# Patient Record
Sex: FEMALE | Race: WHITE | NOT HISPANIC OR LATINO | Employment: UNEMPLOYED | URBAN - METROPOLITAN AREA
[De-identification: names, ages, dates, MRNs, and addresses within clinical notes are randomized per-mention and may not be internally consistent; named-entity substitution may affect disease eponyms.]

---

## 2017-01-01 ENCOUNTER — MEDICAL CORRESPONDENCE (OUTPATIENT)
Dept: HEALTH INFORMATION MANAGEMENT | Facility: CLINIC | Age: 27
End: 2017-01-01

## 2017-01-06 ENCOUNTER — VIRTUAL VISIT (OUTPATIENT)
Dept: FAMILY MEDICINE | Facility: CLINIC | Age: 27
End: 2017-01-06
Payer: COMMERCIAL

## 2017-01-06 DIAGNOSIS — Z02.9 ADMINISTRATIVE ENCOUNTER: Primary | ICD-10-CM

## 2017-01-06 DIAGNOSIS — F39 EPISODIC MOOD DISORDER (H): ICD-10-CM

## 2017-01-06 DIAGNOSIS — R62.50 DEVELOPMENTAL DELAY: ICD-10-CM

## 2017-01-06 DIAGNOSIS — Q03.9 CONGENITAL HYDROCEPHALUS (H): ICD-10-CM

## 2017-01-06 PROCEDURE — 98966 PH1 ASSMT&MGMT NQHP 5-10: CPT | Performed by: PHYSICIAN ASSISTANT

## 2017-01-06 NOTE — Clinical Note
January 6, 2017        Perlita Molina  [address] [place] [address]          To whom it may concern:    RE: Perlita Bhat is under my profession medical care at the [facility].  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  She has a Developmental Delay that makes her incapable of self support or handling her own finances.  She has seen many specialists in the past for this, including a neurologist, a neurosurgeon, a psychologist and a psychiatrist.  Her condition is stable at this time and I do not expect any drastic changes in the future, therefore she no longer managed by any of the above listed specialists.  Her last exam was on 11/29/16 and her physical exam findings were stable.      Please contact me for questions or concerns.      Sincerely,        Lorna James PA-C

## 2017-01-06 NOTE — PROGRESS NOTES
"HPI      ROS      Physical Exam      SUBJECTIVE:                                                      Perlita Molina is a 26 year old female who is being evaluated via a telephone visit.      The patient has been notified of following:     \"This telephone visit will be conducted via a call between you and your physician/provider. We have found that certain health care needs can be provided without the need for a physical exam.  This service lets us provide the care you need with a short phone conversation.  If a prescription is necessary we can send it directly to your pharmacy.  If lab work is needed we can place an order for that and you can then stop by our lab to have the test done at a later time.    We will bill your insurance company for this service.  Please check with your medical insurance if this type of visit is covered. You may be responsible for the cost of this type of visit if insurance coverage is denied.  The typical cost is $30 (10min), $59 (11-20min) and $85 (21-30min).  Most often these visits are shorter than 10 minutes.    If during the course of the call the physician/provider feels a telephone visit is not appropriate, you will not be charged for this service.\"       Consent has been obtained for this service by 1 care team members: yes. See the scanned image in the medical record.    Perlita Molina complains of  Forms      I have reviewed and updated the patient's Past Medical History, Social History, Family History and Medication List.    ALLERGIES  Nkda    Nikia,CMA (MA signature)    Additional provider notes:   Start: 9:24 am  End: 9:30 am    We started this phone visit to discuss paperwork needed by the government that confirms her developmental disability.  See letter from today for complete information discussed on phone today.   Psychiatrist: was seeing a psychiatrist, however symptoms stable therefore I am managing her prescriptions.   Condition Stable.    Restrictions:  Mental " restrictions.  Difficulty with running, will sometimes shake.    She goes to a day program and does activities in the community.  She does work in the production room (assembling things).   Incapable of self support or handling her own finances.    Assessment/Plan:  (Z02.9) Administrative encounter  (primary encounter diagnosis)  Comment: Forms completed, letter written on letterhead.   Plan:     (Q03.9) Congenital hydrocephalus (H)  Comment: stable.   Plan:     (R62.50) Developmental delay  Comment: stable.   Plan:     (F39) Episodic mood disorder (H)  Comment: stable.   Plan:       I have reviewed the note as documented above.  This accurately captures the substance of my conversation with the patient,  Lorna James PA-C     Total time of call between patient and provider was 6 minutes     Lorna James PA-C

## 2017-01-15 DIAGNOSIS — K59.01 SLOW TRANSIT CONSTIPATION: Primary | ICD-10-CM

## 2017-01-15 NOTE — TELEPHONE ENCOUNTER
polyethylene glycol (MIRALAX/GLYCOLAX) powder      Last Written Prescription Date: 12/28/15  Last Fill Quantity: 527g,  # refills: 3   Last Office Visit with [facility], [facility] or [facility] prescribing provider: 11/29/16

## 2017-01-17 RX ORDER — POLYETHYLENE GLYCOL 3350 17 G/17G
POWDER, FOR SOLUTION ORAL
Qty: 527 G | Refills: 2 | Status: SHIPPED | OUTPATIENT
Start: 2017-01-17 | End: 2017-08-21

## 2017-01-17 NOTE — TELEPHONE ENCOUNTER
Prescription approved per [facility] Refill Protocol.  Dionna Christensen RN

## 2017-02-11 DIAGNOSIS — F45.8 ANXIETY HYPERVENTILATION: Primary | ICD-10-CM

## 2017-02-11 DIAGNOSIS — F41.9 ANXIETY HYPERVENTILATION: Primary | ICD-10-CM

## 2017-02-12 NOTE — TELEPHONE ENCOUNTER
FLUoxetine (PROZAC) 20 MG capsule       Last Written Prescription Date: 12/28/16  Last Fill Quantity: 90; # refills: 3  Last Office Visit with [facility], [facility] or [facility] prescribing provider:  11/29/16        Last PHQ-9 score on record=   PHQ-9 SCORE 11/24/2010   Total Score 4       No results found for: AST  No results found for: ALT

## 2017-02-14 NOTE — TELEPHONE ENCOUNTER
Prescription approved per [facility] Refill Protocol.  Kavita Patel RN

## 2017-02-25 DIAGNOSIS — N92.0 EXCESSIVE OR FREQUENT MENSTRUATION: ICD-10-CM

## 2017-02-25 NOTE — TELEPHONE ENCOUNTER
levonorgest-eth estrad 91-Day (AMETHIA) 0.15-0.03 &0.01 MG per tablet      Last Written Prescription Date: 11/29/16  Last Fill Quantity: 91, # refills: 0  Last Office Visit with [facility], P or [facility] prescribing provider: 12/30/16       BP Readings from Last 3 Encounters:   11/29/16 130/74   12/28/15 122/84   09/02/15 135/84     Date of last Breast Exam: unknown

## 2017-02-28 RX ORDER — ETHINYL ESTRADIOL AND LEVONORGESTREL 150-30(84)
KIT ORAL
Qty: 91 TABLET | Refills: 1 | Status: SHIPPED | OUTPATIENT
Start: 2017-02-28 | End: 2017-08-21

## 2017-02-28 NOTE — TELEPHONE ENCOUNTER
Routing refill request to provider for review/approval because:  Drug not on the [facility] refill protocol with associated diagnosis  Dionna Christensen RN

## 2017-05-03 ENCOUNTER — DOCUMENTATION ONLY (OUTPATIENT)
Dept: OTHER | Facility: CLINIC | Age: 27
End: 2017-05-03

## 2017-05-03 DIAGNOSIS — Z71.89 ACP (ADVANCE CARE PLANNING): Chronic | ICD-10-CM

## 2017-05-13 DIAGNOSIS — F41.9 ANXIETY HYPERVENTILATION: ICD-10-CM

## 2017-05-13 DIAGNOSIS — F45.8 ANXIETY HYPERVENTILATION: ICD-10-CM

## 2017-05-13 NOTE — TELEPHONE ENCOUNTER
FLUoxetine (PROZAC) 20 MG capsule     Last Written Prescription Date: 2/14/17  Last Fill Quantity: 90, # refills: 0  Last Office Visit with G primary care provider:  11/29/16        Last PHQ-9 score on record=   PHQ-9 SCORE 11/24/2010   Total Score 4               Regan Faarax  [facility]

## 2017-05-17 NOTE — TELEPHONE ENCOUNTER
Prescription approved per [facility] Refill Protocol.  Dionna Christensen RN

## 2017-08-12 DIAGNOSIS — F45.8 ANXIETY HYPERVENTILATION: ICD-10-CM

## 2017-08-12 DIAGNOSIS — F41.9 ANXIETY HYPERVENTILATION: ICD-10-CM

## 2017-08-12 NOTE — TELEPHONE ENCOUNTER
FLUoxetine (PROZAC) 20 MG capsule     Last Written Prescription Date: 5/17/17  Last Fill Quantity: 90, # refills: 0  Last Office Visit with [facility] primary care provider:  11/29/16   Next 5 appointments (look out 90 days)     Aug 21, 2017  2:10 PM CDT   SHORT with Kristen M Kehr, PA-C   [facility]    [address] [facility] [address] [phone]                   Last PHQ-9 score on record=   PHQ-9 SCORE 11/24/2010   Total Score 4             Nereida Rose  [facility]

## 2017-08-21 ENCOUNTER — OFFICE VISIT (OUTPATIENT)
Dept: FAMILY MEDICINE | Facility: CLINIC | Age: 27
End: 2017-08-21
Payer: COMMERCIAL

## 2017-08-21 VITALS
SYSTOLIC BLOOD PRESSURE: 134 MMHG | TEMPERATURE: 97.2 F | HEART RATE: 99 BPM | OXYGEN SATURATION: 97 % | BODY MASS INDEX: 35.52 KG/M2 | DIASTOLIC BLOOD PRESSURE: 86 MMHG | WEIGHT: 197 LBS

## 2017-08-21 DIAGNOSIS — N92.0 EXCESSIVE OR FREQUENT MENSTRUATION: Primary | ICD-10-CM

## 2017-08-21 DIAGNOSIS — K59.01 SLOW TRANSIT CONSTIPATION: ICD-10-CM

## 2017-08-21 DIAGNOSIS — E28.2 PCOS (POLYCYSTIC OVARIAN SYNDROME): ICD-10-CM

## 2017-08-21 DIAGNOSIS — F39 EPISODIC MOOD DISORDER (H): ICD-10-CM

## 2017-08-21 DIAGNOSIS — Q03.9 CONGENITAL HYDROCEPHALUS (H): ICD-10-CM

## 2017-08-21 PROCEDURE — 99214 OFFICE O/P EST MOD 30 MIN: CPT | Performed by: PHYSICIAN ASSISTANT

## 2017-08-21 RX ORDER — LEVONORGESTREL / ETHINYL ESTRADIOL AND ETHINYL ESTRADIOL 150-30(84)
1 KIT ORAL DAILY
Qty: 91 TABLET | Refills: 3 | Status: SHIPPED | OUTPATIENT
Start: 2017-08-21 | End: 2018-09-08

## 2017-08-21 RX ORDER — POLYETHYLENE GLYCOL 3350 17 G/17G
POWDER, FOR SOLUTION ORAL
Qty: 527 G | Refills: 11 | Status: SHIPPED | OUTPATIENT
Start: 2017-08-21 | End: 2018-11-10

## 2017-08-21 NOTE — MR AVS SNAPSHOT
After Visit Summary   8/21/2017    Perlita Molina    MRN: [id number]           Patient Information     Date Of Birth          1990        Visit Information        Provider Department      8/21/2017 2:10 PM Kehr, Kristen M, PA-C [facility]        Today's Diagnoses     Excessive or frequent menstruation        Slow transit constipation           Follow-ups after your visit        Who to contact     If you have questions or need follow up information about today's clinic visit or your schedule please contact [facility] directly at [phone].  Normal or non-critical lab and imaging results will be communicated to you by Cura TVhart, letter or phone within 4 business days after the clinic has received the results. If you do not hear from us within 7 days, please contact the clinic through UpTot or phone. If you have a critical or abnormal lab result, we will notify you by phone as soon as possible.  Submit refill requests through [employer] or call your pharmacy and they will forward the refill request to us. Please allow 3 business days for your refill to be completed.          Additional Information About Your Visit        MyChart Information     [employer] gives you secure access to your electronic health record. If you see a primary care provider, you can also send messages to your care team and make appointments. If you have questions, please call your primary care clinic.  If you do not have a primary care provider, please call [phone] and they will assist you.        Care EveryWhere ID     This is your Care EveryWhere ID. This could be used by other organizations to access your [place] medical records  [id number]        Your Vitals Were     Pulse Temperature Pulse Oximetry BMI (Body Mass Index)          99 97.2  F (36.2  C) (Oral) 97% 35.52 kg/m2         Blood Pressure from Last 3 Encounters:   08/21/17 134/86   11/29/16 130/74   12/28/15 122/84    Weight from Last 3  Encounters:   08/21/17 197 lb (89.4 kg)   11/29/16 198 lb 3.2 oz (89.9 kg)   12/28/15 186 lb (84.4 kg)              Today, you had the following     No orders found for display         Today's Medication Changes          These changes are accurate as of: 8/21/17  2:44 PM.  If you have any questions, ask your nurse or doctor.               These medicines have changed or have updated prescriptions.        Dose/Directions    levonorgest-eth estrad 91-Day 0.15-0.03 &0.01 MG per tablet   Commonly known as:  AMETHIA   This may have changed:  See the new instructions.   Used for:  Excessive or frequent menstruation   Changed by:  Kehr, Kristen M, PA-C        Dose:  1 tablet   Take 1 tablet by mouth daily   Quantity:  91 tablet   Refills:  3       polyethylene glycol powder   Commonly known as:  MIRALAX/GLYCOLAX   This may have changed:  See the new instructions.   Used for:  Slow transit constipation   Changed by:  Kehr, Kristen M, PA-C        TAKE A HALF TO ONE CAPFUL IN 8OZ OF BEVERAGE AND DRINK ONCE DAILY   Quantity:  527 g   Refills:  11            Where to get your medicines      These medications were sent to [facility] [address] [place] [address] AT [address] [address]     Phone:  [phone]     levonorgest-eth estrad 91-Day 0.15-0.03 &0.01 MG per tablet    polyethylene glycol powder                Primary Care Provider Office Phone # Fax #    Lorna Leighann James PA-C [phone]       [address]  [facility] [address]        Equal Access to Services     [facility]: Hadii marky larkin Soomaali, waaxda luqadaha, qaybta kaalmada adeegyada, sarath cottrell. So [facility] [phone].    ATENCIÓN: Si habla español, tiene a arrieta disposición servicios gratuitos de asistencia lingüística. Llame al [phone].    We comply with applicable federal civil rights laws and [place] laws. We do  not discriminate on the basis of race, color, national origin, age, disability sex, sexual orientation or gender identity.            Thank you!     Thank you for choosing [facility]  [facility]. Our goal is always to provide you with excellent care. Hearing back from our patients is one way we can continue to improve our services. Please take a few minutes to complete the written survey that you may receive in the mail after your visit with us. Thank you!             Your Updated Medication List - Protect others around you: Learn how to safely use, store and throw away your medicines at www.disposemymeds.org.          This list is accurate as of: 8/21/17  2:45 PM.  Always use your most recent med list.                   Brand Name Dispense Instructions for use Diagnosis    DAILY MULTIVITAMIN PO      Take  by mouth.        FLUoxetine 20 MG capsule    PROzac    90 capsule    TAKE ONE CAPSULE BY MOUTH EVERY DAY    Anxiety hyperventilation       levonorgest-eth estrad 91-Day 0.15-0.03 &0.01 MG per tablet    AMETHIA    91 tablet    Take 1 tablet by mouth daily    Excessive or frequent menstruation       polyethylene glycol powder    MIRALAX/GLYCOLAX    527 g    TAKE A HALF TO ONE CAPFUL IN 8OZ OF BEVERAGE AND DRINK ONCE DAILY    Slow transit constipation

## 2017-08-21 NOTE — PROGRESS NOTES
SUBJECTIVE:   Perlita Molina is a 27 year old female who presents to clinic today for the following health issues:      Establish care and discuss medications.   Perlita is here today with her mother. She has developmental delay and was established in the [place] office. Her provider is transferring to a new location. She is here to establish and get refills of her medication.     She has a history of congenital hydrocephalus and is managed by Neurosurgery at [place]. She is seen every 2 years.     She has PCOS regulated with OCP. She is due for a refill of her prescription today.     Lastly, she also has mood swings and was evaluated by Psychiatry in the past and started prozac. She has been on 20 mg for years. She was eventually transitioned to Family Practice to prescribe this medication.         Problem list and histories reviewed & adjusted, as indicated.  Additional history: as documented    Patient Active Problem List   Diagnosis     RET s/p repair x2     Congenital hydrocephalus (H)     Developmental delay     PCOS (polycystic ovarian syndrome)     Nonfunctioning ventriculoperitoneal shunt (H)     Episodic mood disorder (H)     Hearing loss     Constipation     CARDIOVASCULAR SCREENING; LDL GOAL LESS THAN 160     Excessive or frequent menstruation     Anxiety     Congenital jaw deformity     ACP (advance care planning)     Past Surgical History:   Procedure Laterality Date     BIOPSY  August 2017    Awaiting results     EYE SURGERY  age 4 or 5    strabismus      IMPLANT SHUNT VENTRICULOPERITONEAL  1 week of age    for hydrocephalus; had last revision at age 3     STRABISMUS SURGERY       SURGICAL HISTORY OF -   by age 5    strabismus surgery x2       Social History   Substance Use Topics     Smoking status: Never Smoker     Smokeless tobacco: Never Used     Alcohol use No     Family History   Problem Relation Age of Onset     CANCER Paternal Grandmother      HEART DISEASE Paternal Grandfather       DIABETES Mother      Hypertension Mother      Obesity Mother      Hypertension Father      Obesity Father          Current Outpatient Prescriptions   Medication Sig Dispense Refill     levonorgest-eth estrad 91-Day (AMETHIA) 0.15-0.03 &0.01 MG per tablet Take 1 tablet by mouth daily 91 tablet 3     polyethylene glycol (MIRALAX/GLYCOLAX) powder TAKE A HALF TO ONE CAPFUL IN 8OZ OF BEVERAGE AND DRINK ONCE DAILY 527 g 11     FLUoxetine (PROZAC) 20 MG capsule TAKE ONE CAPSULE BY MOUTH EVERY DAY 90 capsule 0     Multiple Vitamin (DAILY MULTIVITAMIN PO) Take  by mouth.       [DISCONTINUED] AMETHIA 0.15-0.03 &0.01 MG per tablet TAKE 1 TABLET BY MOUTH DAILY 91 tablet 1     Allergies   Allergen Reactions     Nkda [No Known Drug Allergies]          Reviewed and updated as needed this visit by clinical staffTobacco  Allergies  Meds  Med Hx  Surg Hx  Fam Hx  Soc Hx      Reviewed and updated as needed this visit by Provider         ROS:  Constitutional, HEENT, cardiovascular, pulmonary, gi and gu systems are negative, except as otherwise noted.      OBJECTIVE:   /86  Pulse 99  Temp 97.2  F (36.2  C) (Oral)  Wt 197 lb (89.4 kg)  SpO2 97%  BMI 35.52 kg/m2  Body mass index is 35.52 kg/(m^2).  GENERAL: healthy, alert and no distress  PSYCH: affect normal/bright    Diagnostic Test Results:  none     ASSESSMENT/PLAN:       1. Excessive or frequent menstruation   2. PCOS  Stable with the use of oral contraception. She has a menstrual cycle every 3 months.   - levonorgest-eth estrad 91-Day (AMETHIA) 0.15-0.03 &0.01 MG per tablet; Take 1 tablet by mouth daily  Dispense: 91 tablet; Refill: 3    3. Slow transit constipation  Stable with the use of  Miralax. Refills given  - polyethylene glycol (MIRALAX/GLYCOLAX) powder; TAKE A HALF TO ONE CAPFUL IN 8OZ OF BEVERAGE AND DRINK ONCE DAILY  Dispense: 527 g; Refill: 11    4. Episodic mood disorder  Stable with the use of the prozac. She does not need refills at this time.   She  is unable to complete the PHQ9 due to developmental delay.   Plan refills x 1 year when she is due. Mother will continue to monitor and notify if there are changes.     5. Congenital hydrocephalus  Managed by [facility]    Plan to see her yearly, unless concerns or changes.     Kristen M. Kehr, PA-C  [facility]

## 2017-08-21 NOTE — NURSING NOTE
"Chief Complaint   Patient presents with     Establish Care       Initial /86  Pulse 99  Temp 97.2  F (36.2  C) (Oral)  Wt 197 lb (89.4 kg)  SpO2 97%  BMI 35.52 kg/m2 Estimated body mass index is 35.52 kg/(m^2) as calculated from the following:    Height as of 11/29/16: 5' 2.44\" (1.586 m).    Weight as of this encounter: 197 lb (89.4 kg).  Medication Reconciliation: complete    ELIAS Peacock MA    "

## 2017-10-17 DIAGNOSIS — F45.8 ANXIETY HYPERVENTILATION: ICD-10-CM

## 2017-10-17 DIAGNOSIS — F41.9 ANXIETY HYPERVENTILATION: ICD-10-CM

## 2017-11-15 ENCOUNTER — TRANSFERRED RECORDS (OUTPATIENT)
Dept: HEALTH INFORMATION MANAGEMENT | Facility: CLINIC | Age: 27
End: 2017-11-15

## 2017-12-09 DIAGNOSIS — F41.9 ANXIETY HYPERVENTILATION: ICD-10-CM

## 2017-12-09 DIAGNOSIS — F45.8 ANXIETY HYPERVENTILATION: ICD-10-CM

## 2017-12-18 ENCOUNTER — TELEPHONE (OUTPATIENT)
Dept: FAMILY MEDICINE | Facility: CLINIC | Age: 27
End: 2017-12-18

## 2017-12-18 NOTE — TELEPHONE ENCOUNTER
Advise can refill prescription from 10/19/17 for 1 year.  Verbalized good understanding.   Billie Lyn RN

## 2017-12-18 NOTE — TELEPHONE ENCOUNTER
Skyla from [facility] is calling to request RX: FLUoxetine (PROZAC) 20 MG capsule. Thank  You.

## 2018-01-22 ENCOUNTER — OFFICE VISIT (OUTPATIENT)
Dept: OPHTHALMOLOGY | Facility: CLINIC | Age: 28
End: 2018-01-22
Payer: COMMERCIAL

## 2018-01-22 DIAGNOSIS — H52.202 ASTIGMATISM OF LEFT EYE, UNSPECIFIED TYPE: ICD-10-CM

## 2018-01-22 DIAGNOSIS — H50.00 ESOTROPIA: Primary | ICD-10-CM

## 2018-01-22 DIAGNOSIS — H52.13 MYOPIA OF BOTH EYES: ICD-10-CM

## 2018-01-22 DIAGNOSIS — Q03.9 CONGENITAL HYDROCEPHALUS (H): ICD-10-CM

## 2018-01-22 PROCEDURE — 92015 DETERMINE REFRACTIVE STATE: CPT | Performed by: STUDENT IN AN ORGANIZED HEALTH CARE EDUCATION/TRAINING PROGRAM

## 2018-01-22 PROCEDURE — 92014 COMPRE OPH EXAM EST PT 1/>: CPT | Performed by: STUDENT IN AN ORGANIZED HEALTH CARE EDUCATION/TRAINING PROGRAM

## 2018-01-22 ASSESSMENT — VISUAL ACUITY
OD_CC+: -1
OS_CC+: -1
METHOD: SNELLEN - LINEAR
OD_CC: 20/70
OD_PH_CC: 20/50
OS_PH_CC: 40+2
OS_CC: 3
CORRECTION_TYPE: GLASSES
OD_CC: 3
OS_CC: 20/40

## 2018-01-22 ASSESSMENT — REFRACTION_WEARINGRX
OD_SPHERE: -7.50
OS_AXIS: 112
OS_CYLINDER: +0.75
OD_CYLINDER: +0.50
OS_SPHERE: -6.75
OD_AXIS: 176
SPECS_TYPE: SVL

## 2018-01-22 ASSESSMENT — REFRACTION_MANIFEST
OS_CYLINDER: +0.50
OS_SPHERE: -7.25
OD_SPHERE: -8.25
OS_AXIS: 120
OD_CYLINDER: SPHERE

## 2018-01-22 ASSESSMENT — CONF VISUAL FIELD
OS_NORMAL: 1
OD_NORMAL: 1

## 2018-01-22 ASSESSMENT — TONOMETRY
IOP_METHOD: APPLANATION
OD_IOP_MMHG: 13
OS_IOP_MMHG: 13

## 2018-01-22 ASSESSMENT — SLIT LAMP EXAM - LIDS
COMMENTS: NORMAL
COMMENTS: NORMAL

## 2018-01-22 ASSESSMENT — EXTERNAL EXAM - LEFT EYE: OS_EXAM: NORMAL

## 2018-01-22 ASSESSMENT — CUP TO DISC RATIO
OS_RATIO: 0.6
OD_RATIO: 0.6

## 2018-01-22 ASSESSMENT — EXTERNAL EXAM - RIGHT EYE: OD_EXAM: NORMAL

## 2018-01-22 NOTE — LETTER
Perlita Molina   [id number]    January 22, 2018    Dear Colleague,    Your patient, Perlita Molina, was seen at the [facility] ophthalmology clinic on January 22, 2018. Her eye exam was stable at this time.     If you require any questions or concerns, please feel free to contact me.      Sincerely,    Litzy Lee  [facility]  [address] [place] [address] [phone]

## 2018-01-22 NOTE — MR AVS SNAPSHOT
After Visit Summary   1/22/2018    Perlita Molina    MRN: [id number]           Patient Information     Date Of Birth          1990        Visit Information        Provider Department      1/22/2018 2:00 PM Litzy Lee MD [facility]        Today's Diagnoses     RET s/p repair x2    -  1    Congenital hydrocephalus        Myopia of both eyes        Astigmatism of left eye, unspecified type          Care Instructions    Glasses prescription given - optional     Litzy Lee MD  [phone]            Follow-ups after your visit        Follow-up notes from your care team     Return in about 1 year (around 1/22/2019) for Complete Exam.      Who to contact     If you have questions or need follow up information about today's clinic visit or your schedule please contact [facility] directly at [phone].  Normal or non-critical lab and imaging results will be communicated to you by MyChart, letter or phone within 4 business days after the clinic has received the results. If you do not hear from us within 7 days, please contact the clinic through MyChart or phone. If you have a critical or abnormal lab result, we will notify you by phone as soon as possible.  Submit refill requests through [employer] or call your pharmacy and they will forward the refill request to us. Please allow 3 business days for your refill to be completed.          Additional Information About Your Visit        MyChart Information     [employer] gives you secure access to your electronic health record. If you see a primary care provider, you can also send messages to your care team and make appointments. If you have questions, please call your primary care clinic.  If you do not have a primary care provider, please call [phone] and they will assist you.        Care EveryWhere ID     This is your Care EveryWhere ID. This could be used by other organizations to access your [facility]  records  [id number]         Blood Pressure from Last 3 Encounters:   08/21/17 134/86   11/29/16 130/74   12/28/15 122/84    Weight from Last 3 Encounters:   08/21/17 89.4 kg (197 lb)   11/29/16 89.9 kg (198 lb 3.2 oz)   12/28/15 84.4 kg (186 lb)              We Performed the Following     EYE EXAM (SIMPLE-NONBILLABLE)     REFRACTIVE STATUS        Primary Care Provider Office Phone # Fax #    Kristen M Kehr, PA-C [phone] [address] [facility] [address]        Equal Access to Services     [facility] RONNI : Hadii marky santos hadasho Soomaali, waaxda luqadaha, qaybta kaalmada adeegyada, sarath pack . So [facility] [phone].    ATENCIÓN: Si habla español, tiene a arrieta disposición servicios gratuitos de asistencia lingüística. Llame al [phone].    We comply with applicable federal civil rights laws and [place] laws. We do not discriminate on the basis of race, color, national origin, age, disability, sex, sexual orientation, or gender identity.            Thank you!     [facility]  [facility]. Our goal is always to provide you with excellent care. Hearing back from our patients is one way we can continue to improve our services. Please take a few minutes to complete the written survey that you may receive in the mail after your visit with us. Thank you!             Your Updated Medication List - Protect others around you: Learn how to safely use, store and throw away your medicines at www.disposemymeds.org.          This list is accurate as of: 1/22/18  2:19 PM.  Always use your most recent med list.                   Brand Name Dispense Instructions for use Diagnosis    DAILY MULTIVITAMIN PO      Take  by mouth.        FLUoxetine 20 MG capsule    PROzac    90 capsule    Take 1 capsule (20 mg) by mouth daily    Anxiety hyperventilation       levonorgest-eth estrad 91-Day 0.15-0.03 &0.01 MG per tablet    AMETHIA    91 tablet     Take 1 tablet by mouth daily    Excessive or frequent menstruation       polyethylene glycol powder    MIRALAX/GLYCOLAX    527 g    TAKE A HALF TO ONE CAPFUL IN 8OZ OF BEVERAGE AND DRINK ONCE DAILY    Slow transit constipation

## 2018-07-25 ENCOUNTER — TRANSFERRED RECORDS (OUTPATIENT)
Dept: HEALTH INFORMATION MANAGEMENT | Facility: CLINIC | Age: 28
End: 2018-07-25

## 2018-09-08 DIAGNOSIS — F45.8 ANXIETY HYPERVENTILATION: ICD-10-CM

## 2018-09-08 DIAGNOSIS — F41.9 ANXIETY HYPERVENTILATION: ICD-10-CM

## 2018-09-08 DIAGNOSIS — N92.0 EXCESSIVE OR FREQUENT MENSTRUATION: ICD-10-CM

## 2018-09-08 NOTE — LETTER
September 12, 2018    Perlita Molina  [address] [facility] [address]    Dear Perlita,       We recently received a refill request for fluoxetine and ashlyna.  We have refilled this for a one time 30 day supply only because you are due for a:    Physical office visit      Please call at your earliest convenience so that there will not be a delay with your future refills.          Thank you,   Your [facility] Team/  [phone]

## 2018-09-11 RX ORDER — LEVONORGESTREL AND ETHINYL ESTRADIOL AND ETHINYL ESTRADIOL 150-30(84)
KIT ORAL
Qty: 30 TABLET | Refills: 0 | Status: SHIPPED | OUTPATIENT
Start: 2018-09-11 | End: 2018-10-01

## 2018-09-11 NOTE — TELEPHONE ENCOUNTER
1 month supply only as patient is overdue for appointment     TC, patient due for:  AFE,   Health Maintenance Due   Topic Date Due     HIV SCREEN (SYSTEM ASSIGNED)  02/28/2009     PHQ-2 Q1 YR  12/28/2016     INFLUENZA VACCINE (1) 09/01/2018     PAP Q3 YR  12/28/2018     Vida Caba, GABEN RN

## 2018-10-01 ENCOUNTER — OFFICE VISIT (OUTPATIENT)
Dept: FAMILY MEDICINE | Facility: CLINIC | Age: 28
End: 2018-10-01
Payer: COMMERCIAL

## 2018-10-01 VITALS
HEART RATE: 96 BPM | SYSTOLIC BLOOD PRESSURE: 125 MMHG | BODY MASS INDEX: 34.31 KG/M2 | OXYGEN SATURATION: 98 % | TEMPERATURE: 98.2 F | HEIGHT: 64 IN | WEIGHT: 201 LBS | DIASTOLIC BLOOD PRESSURE: 85 MMHG

## 2018-10-01 DIAGNOSIS — E28.2 PCOS (POLYCYSTIC OVARIAN SYNDROME): ICD-10-CM

## 2018-10-01 DIAGNOSIS — F39 EPISODIC MOOD DISORDER (H): ICD-10-CM

## 2018-10-01 DIAGNOSIS — R63.5 WEIGHT GAIN: ICD-10-CM

## 2018-10-01 DIAGNOSIS — N92.0 EXCESSIVE OR FREQUENT MENSTRUATION: Primary | ICD-10-CM

## 2018-10-01 DIAGNOSIS — Q03.9 CONGENITAL HYDROCEPHALUS (H): ICD-10-CM

## 2018-10-01 PROCEDURE — 99214 OFFICE O/P EST MOD 30 MIN: CPT | Performed by: PHYSICIAN ASSISTANT

## 2018-10-01 RX ORDER — LEVONORGESTREL / ETHINYL ESTRADIOL AND ETHINYL ESTRADIOL 150-30(84)
1 KIT ORAL DAILY
Qty: 84 TABLET | Refills: 4 | Status: SHIPPED | OUTPATIENT
Start: 2018-10-01 | End: 2019-10-07

## 2018-10-01 ASSESSMENT — PAIN SCALES - GENERAL: PAINLEVEL: NO PAIN (0)

## 2018-10-01 NOTE — PROGRESS NOTES
SUBJECTIVE:   Perlita Molina is a 28 year old female who presents to clinic today for the following health issues:    Perlita is here today with her mother. She is here for stable chronic medical conditions and refills of medications.   She feels well and continues to live at home with her mother and father and working at [employer] and doing well.   She has congenital hydrocephalus and developmental delay. No changes have occurred with her medication within the past year.   She is taking the prozac 20 mg daily for the mood swings.  Miralax for constipation  Oral contraception for control of menses and PCOS  Mother also is concerned about her weight gain.       History of Present Illness     Depression & Anxiety Follow-up:     Depression/Anxiety:  Anxiety only    Status since last visit::  Stable    Other associated symptoms of anxiety::  None    Significant life event::  No    Current substance use::  None    Depression symptoms::  None       Today's PHQ-9         PHQ-9 Total Score:         PHQ-9 Q9 Suicidal ideation:       Thoughts of suicide or self harm:      Self-harm Plan:        Self-harm Action:          Safety concerns for self or others:            Problem list and histories reviewed & adjusted, as indicated.  Additional history: as documented      Patient Active Problem List   Diagnosis     RET s/p repair x2     Congenital hydrocephalus (H)     Developmental delay     PCOS (polycystic ovarian syndrome)     Nonfunctioning ventriculoperitoneal shunt (H)     Episodic mood disorder (H)     Hearing loss     Constipation     CARDIOVASCULAR SCREENING; LDL GOAL LESS THAN 160     Excessive or frequent menstruation     Anxiety     Congenital jaw deformity     ACP (advance care planning)     Past Surgical History:   Procedure Laterality Date     BIOPSY  August 2017    Awaiting results     EYE SURGERY  age 4 or 5    strabismus      IMPLANT SHUNT VENTRICULOPERITONEAL  1 week of age    for hydrocephalus;  "had last revision at age 3     STRABISMUS SURGERY       SURGICAL HISTORY OF -   by age 5    strabismus surgery x2       Social History   Substance Use Topics     Smoking status: Never Smoker     Smokeless tobacco: Never Used     Alcohol use No     Family History   Problem Relation Age of Onset     Cancer Paternal Grandmother      HEART DISEASE Paternal Grandfather      Diabetes Mother      Hypertension Mother      Obesity Mother      Hypertension Father      Obesity Father          Current Outpatient Prescriptions   Medication Sig Dispense Refill     FLUoxetine (PROZAC) 20 MG capsule Take 1 capsule (20 mg) by mouth daily 90 capsule 3     levonorgest-eth estrad 91-Day (ASHLYNA) 0.15-0.03 &0.01 MG per tablet Take 1 tablet by mouth daily Medication taken continuously, menstrual cycle every 3 months 84 tablet 4     Multiple Vitamin (DAILY MULTIVITAMIN PO) Take  by mouth.       polyethylene glycol (MIRALAX/GLYCOLAX) powder TAKE A HALF TO ONE CAPFUL IN 8OZ OF BEVERAGE AND DRINK ONCE DAILY 527 g 11     [DISCONTINUED] ASHLYNA 0.15-0.03 &0.01 MG per tablet TAKE 1 TABLET BY MOUTH DAILY 30 tablet 0     [DISCONTINUED] FLUoxetine (PROZAC) 20 MG capsule TAKE ONE CAPSULE BY MOUTH DAILY 30 capsule 0     Allergies   Allergen Reactions     Nkda [No Known Drug Allergies]      Recent Labs   Lab Test  04/07/14   0832  12/28/12   1222   A1C  5.5   --    LDL   --   82   HDL   --   76   TRIG   --   97   TSH   --   0.88      BP Readings from Last 3 Encounters:   10/01/18 125/85   08/21/17 134/86   11/29/16 130/74    Wt Readings from Last 3 Encounters:   10/01/18 201 lb (91.2 kg)   08/21/17 197 lb (89.4 kg)   11/29/16 198 lb 3.2 oz (89.9 kg)                    ROS:  Constitutional, HEENT, cardiovascular, pulmonary, GI, , musculoskeletal, neuro, skin, endocrine and psych systems are negative, except as otherwise noted.    OBJECTIVE:     /85  Pulse 96  Temp 98.2  F (36.8  C) (Oral)  Ht 5' 4\" (1.626 m)  Wt 201 lb (91.2 kg)  SpO2 " 98%  BMI 34.5 kg/m2  Body mass index is 34.5 kg/(m^2).  GENERAL: healthy, alert and no distress  RESP: lungs clear to auscultation - no rales, rhonchi or wheezes  CV: regular rate and rhythm, normal S1 S2, no S3 or S4, no murmur, click or rub, no peripheral edema and peripheral pulses strong  SKIN: no suspicious lesions or rashes  PSYCH: mentation appears normal, affect normal/bright    Diagnostic Test Results:  none     ASSESSMENT/PLAN:       1. Excessive or frequent menstruation  Stable, refills given for a year  - levonorgest-eth estrad 91-Day (ASHLYNA) 0.15-0.03 &0.01 MG per tablet; Take 1 tablet by mouth daily Medication taken continuously, menstrual cycle every 3 months  Dispense: 84 tablet; Refill: 4  - CBC with platelets differential; Future    2. Episodic mood disorder  Stable moods on the medication. Initially prescribed by Psychiatry and then transitioned to FP for continuation of care. She is doing well.   - FLUoxetine (PROZAC) 20 MG capsule; Take 1 capsule (20 mg) by mouth daily  Dispense: 90 capsule; Refill: 3    3. PCOS (polycystic ovarian syndrome)  Stable, with the use of contraception.   With her weight gain and PCOS, concern with glucose resistance. Plan fasting labs   - Renal panel; Future    4. Weight gain  Check her glucose and thyroid. Encouraged regular exercise and dietary changes.   - TSH with free T4 reflex; Future    5. congenital hydrocephalus  Keep routine appointments at [facility]. She is seen every 2 years.       Kristen M. Kehr, PA-C  [facility]

## 2018-10-01 NOTE — NURSING NOTE
"Chief Complaint   Patient presents with     Depression     Anxiety       Initial /85  Pulse 96  Temp 98.2  F (36.8  C) (Oral)  Ht 5' 4\" (1.626 m)  Wt 201 lb (91.2 kg)  SpO2 98%  BMI 34.5 kg/m2 Estimated body mass index is 34.5 kg/(m^2) as calculated from the following:    Height as of this encounter: 5' 4\" (1.626 m).    Weight as of this encounter: 201 lb (91.2 kg).  Medication Reconciliation: complete    ELIAS Peacock MA    "

## 2018-10-10 ENCOUNTER — DOCUMENTATION ONLY (OUTPATIENT)
Dept: LAB | Facility: CLINIC | Age: 28
End: 2018-10-10

## 2018-10-10 DIAGNOSIS — Z13.6 CARDIOVASCULAR SCREENING; LDL GOAL LESS THAN 160: ICD-10-CM

## 2018-10-10 DIAGNOSIS — Z13.6 CARDIOVASCULAR SCREENING; LDL GOAL LESS THAN 160: Primary | ICD-10-CM

## 2018-10-10 DIAGNOSIS — R63.5 WEIGHT GAIN: ICD-10-CM

## 2018-10-10 DIAGNOSIS — E28.2 PCOS (POLYCYSTIC OVARIAN SYNDROME): ICD-10-CM

## 2018-10-10 DIAGNOSIS — N92.0 EXCESSIVE OR FREQUENT MENSTRUATION: ICD-10-CM

## 2018-10-10 LAB
ALBUMIN SERPL-MCNC: 3.4 G/DL (ref 3.4–5)
ANION GAP SERPL CALCULATED.3IONS-SCNC: 8 MMOL/L (ref 3–14)
BASOPHILS # BLD AUTO: 0 10E9/L (ref 0–0.2)
BASOPHILS NFR BLD AUTO: 0.2 %
BUN SERPL-MCNC: 10 MG/DL (ref 7–30)
CALCIUM SERPL-MCNC: 8.9 MG/DL (ref 8.5–10.1)
CHLORIDE SERPL-SCNC: 106 MMOL/L (ref 94–109)
CHOLEST SERPL-MCNC: 165 MG/DL
CO2 SERPL-SCNC: 24 MMOL/L (ref 20–32)
CREAT SERPL-MCNC: 0.62 MG/DL (ref 0.52–1.04)
DIFFERENTIAL METHOD BLD: NORMAL
EOSINOPHIL # BLD AUTO: 0.3 10E9/L (ref 0–0.7)
EOSINOPHIL NFR BLD AUTO: 3 %
ERYTHROCYTE [DISTWIDTH] IN BLOOD BY AUTOMATED COUNT: 14 % (ref 10–15)
GFR SERPL CREATININE-BSD FRML MDRD: >90 ML/MIN/1.7M2
GLUCOSE SERPL-MCNC: 81 MG/DL (ref 70–99)
HCT VFR BLD AUTO: 42.1 % (ref 35–47)
HDLC SERPL-MCNC: 57 MG/DL
HGB BLD-MCNC: 14.2 G/DL (ref 11.7–15.7)
LDLC SERPL CALC-MCNC: 86 MG/DL
LYMPHOCYTES # BLD AUTO: 2.8 10E9/L (ref 0.8–5.3)
LYMPHOCYTES NFR BLD AUTO: 32 %
MCH RBC QN AUTO: 28.9 PG (ref 26.5–33)
MCHC RBC AUTO-ENTMCNC: 33.7 G/DL (ref 31.5–36.5)
MCV RBC AUTO: 86 FL (ref 78–100)
MONOCYTES # BLD AUTO: 0.5 10E9/L (ref 0–1.3)
MONOCYTES NFR BLD AUTO: 5.6 %
NEUTROPHILS # BLD AUTO: 5.2 10E9/L (ref 1.6–8.3)
NEUTROPHILS NFR BLD AUTO: 59.2 %
NONHDLC SERPL-MCNC: 108 MG/DL
PHOSPHATE SERPL-MCNC: 3 MG/DL (ref 2.5–4.5)
PLATELET # BLD AUTO: 370 10E9/L (ref 150–450)
POTASSIUM SERPL-SCNC: 4.2 MMOL/L (ref 3.4–5.3)
RBC # BLD AUTO: 4.92 10E12/L (ref 3.8–5.2)
SODIUM SERPL-SCNC: 138 MMOL/L (ref 133–144)
TRIGL SERPL-MCNC: 112 MG/DL
TSH SERPL DL<=0.005 MIU/L-ACNC: 1.73 MU/L (ref 0.4–4)
WBC # BLD AUTO: 8.7 10E9/L (ref 4–11)

## 2018-10-10 PROCEDURE — 80069 RENAL FUNCTION PANEL: CPT | Performed by: PHYSICIAN ASSISTANT

## 2018-10-10 PROCEDURE — 80061 LIPID PANEL: CPT | Performed by: PHYSICIAN ASSISTANT

## 2018-10-10 PROCEDURE — 84443 ASSAY THYROID STIM HORMONE: CPT | Performed by: PHYSICIAN ASSISTANT

## 2018-10-10 PROCEDURE — 85025 COMPLETE CBC W/AUTO DIFF WBC: CPT | Performed by: PHYSICIAN ASSISTANT

## 2018-10-10 PROCEDURE — 36415 COLL VENOUS BLD VENIPUNCTURE: CPT | Performed by: PHYSICIAN ASSISTANT

## 2018-10-10 NOTE — PROGRESS NOTES
Patient came in fasting today. Patient has not had a cholesterol checked since 2012. Patient and dad fell it would be a good idea to check. I have drawn an extra tube. Please place a future order if you would like testing done.    Thanks,  Gabriela Beaulieu

## 2018-10-10 NOTE — LETTER
October 11, 2018    Perlita Jesse  [address] [facility] [address]            Dear Perlita,    The results of your recent tests were normal.  Below is a copy of the results.  It was a pleasure to see you at your last appointment.    If you have any questions or concerns, please call myself or my nurse at [phone].    Sincerely,    Kristen Kehr, PA-C / [facility]    Results for orders placed or performed in visit on 10/10/18   Renal panel   Result Value Ref Range    Sodium 138 133 - 144 mmol/L    Potassium 4.2 3.4 - 5.3 mmol/L    Chloride 106 94 - 109 mmol/L    Carbon Dioxide 24 20 - 32 mmol/L    Anion Gap 8 3 - 14 mmol/L    Glucose 81 70 - 99 mg/dL    Urea Nitrogen 10 7 - 30 mg/dL    Creatinine 0.62 0.52 - 1.04 mg/dL    GFR Estimate >90 >60 mL/min/1.7m2    GFR Estimate If Black >90 >60 mL/min/1.7m2    Calcium 8.9 8.5 - 10.1 mg/dL    Phosphorus 3.0 2.5 - 4.5 mg/dL    Albumin 3.4 3.4 - 5.0 g/dL   TSH with free T4 reflex   Result Value Ref Range    TSH 1.73 0.40 - 4.00 mU/L   CBC with platelets differential   Result Value Ref Range    WBC 8.7 4.0 - 11.0 10e9/L    RBC Count 4.92 3.8 - 5.2 10e12/L    Hemoglobin 14.2 11.7 - 15.7 g/dL    Hematocrit 42.1 35.0 - 47.0 %    MCV 86 78 - 100 fl    MCH 28.9 26.5 - 33.0 pg    MCHC 33.7 31.5 - 36.5 g/dL    RDW 14.0 10.0 - 15.0 %    Platelet Count 370 150 - 450 10e9/L    % Neutrophils 59.2 %    % Lymphocytes 32.0 %    % Monocytes 5.6 %    % Eosinophils 3.0 %    % Basophils 0.2 %    Absolute Neutrophil 5.2 1.6 - 8.3 10e9/L    Absolute Lymphocytes 2.8 0.8 - 5.3 10e9/L    Absolute Monocytes 0.5 0.0 - 1.3 10e9/L    Absolute Eosinophils 0.3 0.0 - 0.7 10e9/L    Absolute Basophils 0.0 0.0 - 0.2 10e9/L    Diff Method Automated Method    Lipid panel reflex to direct LDL Fasting   Result Value Ref Range    Cholesterol 165 <200 mg/dL    Triglycerides 112 <150 mg/dL    HDL Cholesterol 57 >49 mg/dL    LDL Cholesterol Calculated 86 <100 mg/dL    Non HDL Cholesterol 108 <130 mg/dL

## 2018-11-10 DIAGNOSIS — K59.01 SLOW TRANSIT CONSTIPATION: ICD-10-CM

## 2018-11-13 RX ORDER — POLYETHYLENE GLYCOL 3350 17 G/17G
POWDER, FOR SOLUTION ORAL
Qty: 527 G | Refills: 10 | Status: SHIPPED | OUTPATIENT
Start: 2018-11-13 | End: 2019-10-07

## 2019-01-25 ENCOUNTER — OFFICE VISIT (OUTPATIENT)
Dept: OPHTHALMOLOGY | Facility: CLINIC | Age: 29
End: 2019-01-25
Payer: COMMERCIAL

## 2019-01-25 ENCOUNTER — DOCUMENTATION ONLY (OUTPATIENT)
Dept: OPHTHALMOLOGY | Facility: CLINIC | Age: 29
End: 2019-01-25

## 2019-01-25 DIAGNOSIS — H50.00 ESOTROPIA: Primary | ICD-10-CM

## 2019-01-25 DIAGNOSIS — H52.202 ASTIGMATISM OF LEFT EYE, UNSPECIFIED TYPE: ICD-10-CM

## 2019-01-25 DIAGNOSIS — H52.13 MYOPIA OF BOTH EYES: ICD-10-CM

## 2019-01-25 DIAGNOSIS — Q03.9 CONGENITAL HYDROCEPHALUS (H): ICD-10-CM

## 2019-01-25 PROCEDURE — 92014 COMPRE OPH EXAM EST PT 1/>: CPT | Performed by: STUDENT IN AN ORGANIZED HEALTH CARE EDUCATION/TRAINING PROGRAM

## 2019-01-25 PROCEDURE — 92015 DETERMINE REFRACTIVE STATE: CPT | Performed by: STUDENT IN AN ORGANIZED HEALTH CARE EDUCATION/TRAINING PROGRAM

## 2019-01-25 ASSESSMENT — REFRACTION_MANIFEST
OS_SPHERE: -7.50
OS_CYLINDER: +1.50
OD_SPHERE: -8.25
OS_AXIS: 111
OD_CYLINDER: SPHERE

## 2019-01-25 ASSESSMENT — VISUAL ACUITY
OS_PH_CC: 20/30
OD_CC: 2
OD_PH_CC: 50-2
OD_CC: 20/60
METHOD: SNELLEN - LINEAR
CORRECTION_TYPE: GLASSES
OS_CC+: -1
OD_CC+: -1
OS_CC: 20/40
OS_CC: 1

## 2019-01-25 ASSESSMENT — CONF VISUAL FIELD: OS_NORMAL: 1

## 2019-01-25 ASSESSMENT — REFRACTION_WEARINGRX
OS_CYLINDER: +0.50
SPECS_TYPE: SVL
OS_SPHERE: -7.25
OD_CYLINDER: SPHERE
OS_AXIS: 123
OD_SPHERE: -8.25

## 2019-01-25 ASSESSMENT — TONOMETRY
OS_IOP_MMHG: 15
OD_IOP_MMHG: 14
IOP_METHOD: APPLANATION

## 2019-01-25 ASSESSMENT — CUP TO DISC RATIO
OS_RATIO: 0.6
OD_RATIO: 0.6

## 2019-01-25 ASSESSMENT — EXTERNAL EXAM - RIGHT EYE: OD_EXAM: NORMAL

## 2019-01-25 ASSESSMENT — SLIT LAMP EXAM - LIDS
COMMENTS: NORMAL
COMMENTS: NORMAL

## 2019-01-25 ASSESSMENT — EXTERNAL EXAM - LEFT EYE: OS_EXAM: NORMAL

## 2019-01-25 NOTE — LETTER
1/25/2019         RE: Perlita Molina  [address] [facility] [address]        Dear Colleague,    Thank you for referring your patient, Perlita Molina, to the [facility]. Her eye exam is stable.  Please see a copy of my visit note below.     Current Eye Medications:  None.       Subjective:  Comprehensive Eye Exam.  Vision is good with correction.  No problems or concerns.      Objective:  See Ophthalmology Exam.       Assessment:  Perlita Molina is a 28 year old female who presents with:   Encounter Diagnoses   Name Primary?     RET s/p repair x2 - Both Eyes      Congenital hydrocephalus Discs normal.       Myopia of both eyes      Astigmatism of left eye, unspecified type        Plan:  Glasses prescription given - optional     Litzy Lee MD  [phone]        Again, thank you for allowing me to participate in the care of your patient.        Sincerely,        Litzy Lee MD

## 2019-01-25 NOTE — PROGRESS NOTES
Current Eye Medications:  None.       Subjective:  Comprehensive Eye Exam.  Vision is good with correction.  No problems or concerns.      Objective:  See Ophthalmology Exam.       Assessment:  Perlita Molina is a 28 year old female who presents with:   Encounter Diagnoses   Name Primary?     RET s/p repair x2 - Both Eyes      Congenital hydrocephalus Discs normal.       Myopia of both eyes      Astigmatism of left eye, unspecified type        Plan:  Glasses prescription given - optional     Litzy Lee MD  [phone]

## 2019-03-09 ENCOUNTER — HEALTH MAINTENANCE LETTER (OUTPATIENT)
Age: 29
End: 2019-03-09

## 2019-08-30 NOTE — PROGRESS NOTES
Current Eye Medications:  None.      Subjective:  Comprehensive Eye Exam.  No vision changes or concerns.  Glasses are working well.      Objective:  See Ophthalmology Exam.      Assessment:  Perlita Molina is a 27 year old female who presents with:     RET s/p repair x2      Congenital hydrocephalus Stable discs.       Plan:  Glasses prescription given - optional     Litzy Lee MD  [phone]           
172.72

## 2019-09-23 ENCOUNTER — TRANSFERRED RECORDS (OUTPATIENT)
Dept: HEALTH INFORMATION MANAGEMENT | Facility: CLINIC | Age: 29
End: 2019-09-23

## 2019-09-25 ENCOUNTER — TRANSFERRED RECORDS (OUTPATIENT)
Dept: HEALTH INFORMATION MANAGEMENT | Facility: CLINIC | Age: 29
End: 2019-09-25

## 2019-09-26 ENCOUNTER — TELEPHONE (OUTPATIENT)
Dept: FAMILY MEDICINE | Facility: CLINIC | Age: 29
End: 2019-09-26

## 2019-09-26 NOTE — TELEPHONE ENCOUNTER
Routed to provider to review and advise regarding message below:    Next 5 appointments (look out 90 days)    Oct 07, 2019  3:20 PM CDT  PHYSICAL with Kristen M Kehr, PA-C  [facility]) [address] [facility] [address]  [phone]   Oct 08, 2019  8:40 AM CDT  Office Visit with Kristen M Kehr, PA-C  [facility] [address] [facility] [address]  [phone]        Would you like separate appointments as scheduled, or can appointments be combined if mother/legal guardian understands and agrees there may be charges associated with the coding of a wound check occurring at the physical?    GABE GallegosN, RN

## 2019-09-26 NOTE — TELEPHONE ENCOUNTER
Thank you for clarification.   This can be done at her routine exam.   Thank you.   Kristen Kehr PA-C

## 2019-09-26 NOTE — TELEPHONE ENCOUNTER
Mother called to add wound check to patient's physical exam appt because she had a new shunt put in her head due to hydrocephalitis.  I thought that would have to be two separate appts so the physical in on 10-1 and wound check on 10-8.  If these two appts can be combined, please call Hien (legal guardian) and let her know.    Thank you.

## 2019-09-26 NOTE — TELEPHONE ENCOUNTER
Please clarify: Any wound check pertaining to her surgery will have to be done by her surgeon.   I can look at the area, but not exactly sure what the expectations and management are.   That will need to be scheduled with the surgeon or his / her team.   Kristen Kehr PA-C

## 2019-09-26 NOTE — TELEPHONE ENCOUNTER
RN returned call to Hien, Legal guardianship for health care form on file.  RN reviewed provider note below with Hien as written.    Hien states that surgery took place on Tuesday at [facility]. She states there is a small incision on scalp, and a small laparscopic incision on the stomach. No sutures, no special instruction.  Hien states the surgeon stated that no follow-up exam is needed with the surgeon. Hien states she just wants PCP to look at the 2 small incisions, and confirm no visible sign of infection.  Will follow-up with surgeon with post-op questions or concerns.    Hien requested that the wound check appointment be cancelled, and provider just look at the area while performing the physical examination component of the physical on 10/7/19.  Routed to provider as MARSHALL.    MAT Gallegos, RN

## 2019-10-04 ASSESSMENT — ENCOUNTER SYMPTOMS
CONSTIPATION: 0
FEVER: 0
FREQUENCY: 0
SHORTNESS OF BREATH: 0
ABDOMINAL PAIN: 0
EYE PAIN: 0
DIZZINESS: 0
HEADACHES: 0
MYALGIAS: 0
BREAST MASS: 0
NAUSEA: 0
ARTHRALGIAS: 0
PALPITATIONS: 0
CHILLS: 0
HEMATOCHEZIA: 0
HEMATURIA: 0
WEAKNESS: 0
PARESTHESIAS: 0
SORE THROAT: 0
DIARRHEA: 0
NERVOUS/ANXIOUS: 0
DYSURIA: 0
JOINT SWELLING: 0
HEARTBURN: 0
COUGH: 0

## 2019-10-07 ENCOUNTER — OFFICE VISIT (OUTPATIENT)
Dept: FAMILY MEDICINE | Facility: CLINIC | Age: 29
End: 2019-10-07
Payer: COMMERCIAL

## 2019-10-07 VITALS
HEIGHT: 63 IN | SYSTOLIC BLOOD PRESSURE: 138 MMHG | WEIGHT: 196 LBS | OXYGEN SATURATION: 94 % | HEART RATE: 93 BPM | DIASTOLIC BLOOD PRESSURE: 83 MMHG | BODY MASS INDEX: 34.73 KG/M2 | TEMPERATURE: 98.6 F

## 2019-10-07 DIAGNOSIS — K59.01 SLOW TRANSIT CONSTIPATION: ICD-10-CM

## 2019-10-07 DIAGNOSIS — F39 EPISODIC MOOD DISORDER (H): ICD-10-CM

## 2019-10-07 DIAGNOSIS — N92.0 EXCESSIVE OR FREQUENT MENSTRUATION: ICD-10-CM

## 2019-10-07 DIAGNOSIS — Q03.9 CONGENITAL HYDROCEPHALUS (H): ICD-10-CM

## 2019-10-07 DIAGNOSIS — Z23 NEED FOR PROPHYLACTIC VACCINATION AND INOCULATION AGAINST INFLUENZA: ICD-10-CM

## 2019-10-07 DIAGNOSIS — Z00.00 ROUTINE GENERAL MEDICAL EXAMINATION AT A HEALTH CARE FACILITY: Primary | ICD-10-CM

## 2019-10-07 PROCEDURE — 99395 PREV VISIT EST AGE 18-39: CPT | Mod: 25 | Performed by: PHYSICIAN ASSISTANT

## 2019-10-07 PROCEDURE — 99213 OFFICE O/P EST LOW 20 MIN: CPT | Mod: 25 | Performed by: PHYSICIAN ASSISTANT

## 2019-10-07 PROCEDURE — 90715 TDAP VACCINE 7 YRS/> IM: CPT | Performed by: PHYSICIAN ASSISTANT

## 2019-10-07 PROCEDURE — 90472 IMMUNIZATION ADMIN EACH ADD: CPT | Performed by: PHYSICIAN ASSISTANT

## 2019-10-07 PROCEDURE — G0145 SCR C/V CYTO,THINLAYER,RESCR: HCPCS | Performed by: PHYSICIAN ASSISTANT

## 2019-10-07 PROCEDURE — 90686 IIV4 VACC NO PRSV 0.5 ML IM: CPT | Performed by: PHYSICIAN ASSISTANT

## 2019-10-07 PROCEDURE — 90471 IMMUNIZATION ADMIN: CPT | Performed by: PHYSICIAN ASSISTANT

## 2019-10-07 RX ORDER — POLYETHYLENE GLYCOL 3350 17 G/17G
POWDER, FOR SOLUTION ORAL
Qty: 527 G | Refills: 10 | Status: SHIPPED | OUTPATIENT
Start: 2019-10-07 | End: 2020-10-26

## 2019-10-07 RX ORDER — LEVONORGESTREL / ETHINYL ESTRADIOL AND ETHINYL ESTRADIOL 150-30(84)
1 KIT ORAL DAILY
Qty: 84 TABLET | Refills: 4 | Status: SHIPPED | OUTPATIENT
Start: 2019-10-07 | End: 2020-10-05

## 2019-10-07 ASSESSMENT — ENCOUNTER SYMPTOMS
COUGH: 0
DYSURIA: 0
FREQUENCY: 0
SHORTNESS OF BREATH: 0
HEARTBURN: 0
DIZZINESS: 0
CONSTIPATION: 0
PALPITATIONS: 0
JOINT SWELLING: 0
NERVOUS/ANXIOUS: 0
BREAST MASS: 0
FEVER: 0
ABDOMINAL PAIN: 0
HEMATOCHEZIA: 0
PARESTHESIAS: 0
WEAKNESS: 0
SORE THROAT: 0
HEMATURIA: 0
MYALGIAS: 0
NAUSEA: 0
EYE PAIN: 0
DIARRHEA: 0
ARTHRALGIAS: 0
HEADACHES: 0
CHILLS: 0

## 2019-10-07 ASSESSMENT — PAIN SCALES - GENERAL: PAINLEVEL: NO PAIN (0)

## 2019-10-07 ASSESSMENT — MIFFLIN-ST. JEOR: SCORE: 1580.24

## 2019-10-07 NOTE — PROGRESS NOTES
SUBJECTIVE:   CC: Perlita Molina is an 29 year old woman who presents for preventive health visit.     Healthy Habits:     Getting at least 3 servings of Calcium per day:  Yes    Bi-annual eye exam:  Yes    Dental care twice a year:  Yes    Sleep apnea or symptoms of sleep apnea:  None    Diet:  Regular (no restrictions)    Frequency of exercise:  None    Taking medications regularly:  Yes    Medication side effects:  None    PHQ-2 Total Score: 0    Additional concerns today:  No      She recently had a new shunt placed for hydrocephaly. The has 2 small surgical incisions that need to be checked.         Today's PHQ-2 Score:   PHQ-2 ( 1999 [employer]) 10/4/2019   Q1: Little interest or pleasure in doing things 0   Q2: Feeling down, depressed or hopeless 0   PHQ-2 Score 0   Q1: Little interest or pleasure in doing things Not at all   Q2: Feeling down, depressed or hopeless Not at all   PHQ-2 Score 0       Abuse: Current or Past(Physical, Sexual or Emotional)-   Do you feel safe in your environment?     Social History     Tobacco Use     Smoking status: Never Smoker     Smokeless tobacco: Never Used   Substance Use Topics     Alcohol use: No     Alcohol/week: 0.0 standard drinks     If you drink alcohol do you typically have >3 drinks per day or >7 drinks per week? No    Alcohol Use 10/4/2019   Prescreen: >3 drinks/day or >7 drinks/week? Not Applicable   Prescreen: >3 drinks/day or >7 drinks/week? -   No flowsheet data found.    Reviewed orders with patient.  Reviewed health maintenance and updated orders accordingly - Yes  BP Readings from Last 3 Encounters:   10/07/19 138/83   10/01/18 125/85   08/21/17 134/86    Wt Readings from Last 3 Encounters:   10/07/19 88.9 kg (196 lb)   10/01/18 91.2 kg (201 lb)   08/21/17 89.4 kg (197 lb)                  Patient Active Problem List   Diagnosis     RET s/p repair x2     Congenital hydrocephalus (H)     Developmental delay     PCOS (polycystic ovarian syndrome)      Nonfunctioning ventriculoperitoneal shunt (H)     Episodic mood disorder (H)     Hearing loss     Constipation     CARDIOVASCULAR SCREENING; LDL GOAL LESS THAN 160     Excessive or frequent menstruation     Anxiety     Congenital jaw deformity     ACP (advance care planning)     Past Surgical History:   Procedure Laterality Date     BIOPSY  August 2017    Awaiting results     EYE SURGERY  age 4 or 5    strabismus      IMPLANT SHUNT VENTRICULOPERITONEAL  1 week of age    for hydrocephalus; had last revision at age 3     STRABISMUS SURGERY       SURGICAL HISTORY OF -   by age 5    strabismus surgery x2       Social History     Tobacco Use     Smoking status: Never Smoker     Smokeless tobacco: Never Used   Substance Use Topics     Alcohol use: No     Alcohol/week: 0.0 standard drinks     Family History   Problem Relation Age of Onset     Cancer Paternal Grandmother      Heart Disease Paternal Grandfather      Diabetes Mother      Hypertension Mother      Obesity Mother      Hypertension Father      Obesity Father          Current Outpatient Medications   Medication Sig Dispense Refill     FLUoxetine (PROZAC) 20 MG capsule Take 1 capsule (20 mg) by mouth daily 90 capsule 3     levonorgest-eth estrad 91-Day (ASHLYNA) 0.15-0.03 &0.01 MG tablet Take 1 tablet by mouth daily Medication taken continuously, menstrual cycle every 3 months 84 tablet 4     Multiple Vitamin (DAILY MULTIVITAMIN PO) Take  by mouth.       polyethylene glycol (MIRALAX/GLYCOLAX) powder TAKE A HALF TO ONE CAPFUL IN 8 OZ OF BEVERAGE AND DRINK ONCE DAILY 527 g 10     Allergies   Allergen Reactions     Nkda [No Known Drug Allergies]      Recent Labs   Lab Test 10/10/18  0758 04/07/14  0832 12/28/12  1222   A1C  --  5.5  --    LDL 86  --  82   HDL 57  --  76   TRIG 112  --  97   CR 0.62  --   --    GFRESTIMATED >90  --   --    GFRESTBLACK >90  --   --    POTASSIUM 4.2  --   --    TSH 1.73  --  0.88        Mammogram not appropriate for this patient based on  age.    Pertinent mammograms are reviewed under the imaging tab.  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   HENT: Negative for congestion, ear pain, hearing loss and sore throat.    Eyes: Negative for pain and visual disturbance.   Respiratory: Negative for cough and shortness of breath.    Cardiovascular: Negative for chest pain, palpitations and peripheral edema.   Gastrointestinal: Negative for abdominal pain, constipation, diarrhea, heartburn, hematochezia and nausea.   Breasts:  Negative for tenderness, breast mass and discharge.   Genitourinary: Negative for dysuria, frequency, genital sores, hematuria, pelvic pain, urgency, vaginal bleeding and vaginal discharge.   Musculoskeletal: Negative for arthralgias, joint swelling and myalgias.   Skin: Negative for rash.   Neurological: Negative for dizziness, weakness, headaches and paresthesias.   Psychiatric/Behavioral: Negative for mood changes. The patient is not  "nervous/anxious.         OBJECTIVE:   /83   Pulse 93   Temp 98.6  F (37  C) (Oral)   Ht 1.588 m (5' 2.5\")   Wt 88.9 kg (196 lb)   LMP 09/13/2019   SpO2 94%   BMI 35.28 kg/m    Physical Exam  GENERAL: healthy, alert and no distress  EYES: Eyes grossly normal to inspection, PERRL and conjunctivae and sclerae normal  HENT: ear canals and TM's normal, nose and mouth without ulcers or lesions  NECK: no adenopathy, no asymmetry, masses, or scars and thyroid normal to palpation  RESP: lungs clear to auscultation - no rales, rhonchi or wheezes  BREAST: normal without masses, tenderness or nipple discharge and no palpable axillary masses or adenopathy  CV: regular rate and rhythm, normal S1 S2, no S3 or S4, no murmur, click or rub, no peripheral edema and peripheral pulses strong  ABDOMEN: soft, nontender, no hepatosplenomegaly, no masses and bowel sounds normal   (female): normal female external genitalia, normal urethral meatus, vaginal mucosa pink, moist, well rugated, and normal cervix/adnexa/uterus without masses or discharge  MS: no gross musculoskeletal defects noted, no edema  SKIN: no suspicious lesions or rashes  There is a small incision on her abdomen - healing well, no redness or sign of infection  Incision on her scalp is also healing well, no redness or sign of infection.   NEURO: Normal strength and tone, mentation intact and speech normal  PSYCH: mentation appears normal, affect normal/bright    Diagnostic Test Results:  Labs reviewed in Epic    ASSESSMENT/PLAN:   1. Routine general medical examination at a health care facility  Health maintenance reviewed and updated.  - PAP imaged thin layer screen reflex to HPV if ASCUS - recommended age 25 - 29 years    2. Congenital hydrocephalus (H)  3. Episodic mood disorder (H)  Stable, refills given.   Recent surgery for a new shunt from her provider at [facility].   - FLUoxetine (PROZAC) 20 MG capsule; Take 1 capsule (20 mg) by mouth daily  Dispense: " "90 capsule; Refill: 3    4. Excessive or frequent menstruation  Stable, refills given  - levonorgest-eth estrad 91-Day (ASHLYNA) 0.15-0.03 &0.01 MG tablet; Take 1 tablet by mouth daily Medication taken continuously, menstrual cycle every 3 months  Dispense: 84 tablet; Refill: 4    5. Slow transit constipation  Stable, refills given  - polyethylene glycol (MIRALAX/GLYCOLAX) powder; TAKE A HALF TO ONE CAPFUL IN 8 OZ OF BEVERAGE AND DRINK ONCE DAILY  Dispense: 527 g; Refill: 10    6. Need for prophylactic vaccination and inoculation against influenza  - INFLUENZA VACCINE IM > 6 MONTHS VALENT IIV4 [80214]  - Vaccine Administration, Each Additional [39694]    COUNSELING:  Reviewed preventive health counseling, as reflected in patient instructions       Regular exercise       Healthy diet/nutrition    Estimated body mass index is 35.28 kg/m  as calculated from the following:    Height as of this encounter: 1.588 m (5' 2.5\").    Weight as of this encounter: 88.9 kg (196 lb).    Weight management plan: Discussed healthy diet and exercise guidelines     reports that she has never smoked. She has never used smokeless tobacco.      Counseling Resources:  ATP IV Guidelines  Pooled Cohorts Equation Calculator  Breast Cancer Risk Calculator  FRAX Risk Assessment  ICSI Preventive Guidelines  Dietary Guidelines for Americans, 2010  USDA's MyPlate  ASA Prophylaxis  Lung CA Screening    Kristen M. Kehr, PA-C  [facility]  "

## 2019-10-07 NOTE — NURSING NOTE
"Chief Complaint   Patient presents with     Physical       Initial /83   Pulse 93   Temp 98.6  F (37  C) (Oral)   Ht 1.588 m (5' 2.5\")   Wt 88.9 kg (196 lb)   LMP 09/13/2019   SpO2 94%   BMI 35.28 kg/m   Estimated body mass index is 35.28 kg/m  as calculated from the following:    Height as of this encounter: 1.588 m (5' 2.5\").    Weight as of this encounter: 88.9 kg (196 lb).  Medication Reconciliation: complete    ELIAS Peacock MA    "

## 2019-10-07 NOTE — NURSING NOTE
Prior to immunization administration, verified patients identity using patient s name and date of birth. Please see Immunization Activity for additional information.     Screening Questionnaire for Adult Immunization    Are you sick today?   No   Do you have allergies to medications, food, a vaccine component or latex?   No   Have you ever had a serious reaction after receiving a vaccination?   No   Do you have a long-term health problem with heart disease, lung disease, asthma, kidney disease, metabolic disease (e.g. diabetes), anemia, or other blood disorder?   No   Do you have cancer, leukemia, HIV/AIDS, or any other immune system problem?   No   In the past 3 months, have you taken medications that affect  your immune system, such as prednisone, other steroids, or anticancer drugs; drugs for the treatment of rheumatoid arthritis, Crohn s disease, or psoriasis; or have you had radiation treatments?   No   Have you had a seizure, or a brain or other nervous system problem?   No   During the past year, have you received a transfusion of blood or blood     products, or been given immune (gamma) globulin or antiviral drug?   No   For women: Are you pregnant or is there a chance you could become        pregnant during the next month?   No   Have you received any vaccinations in the past 4 weeks?   No     Immunization questionnaire answers were all negative. Ok per Kristen Kehr PA-C to get adacel early.        Per orders of Kehr,Kristen PA-C injection of Adacel given by Leroy Peacock MA. Patient instructed to remain in clinic for 15 minutes afterwards, and to report any adverse reaction to me immediately.       Screening performed by Leroy Peacock MA on 10/7/2019 at 4:10 PM.

## 2019-10-11 LAB
COPATH REPORT: NORMAL
PAP: NORMAL

## 2019-11-20 ENCOUNTER — TRANSFERRED RECORDS (OUTPATIENT)
Dept: HEALTH INFORMATION MANAGEMENT | Facility: CLINIC | Age: 29
End: 2019-11-20

## 2019-11-29 DIAGNOSIS — N92.0 EXCESSIVE OR FREQUENT MENSTRUATION: ICD-10-CM

## 2019-11-29 RX ORDER — LEVONORGESTREL AND ETHINYL ESTRADIOL AND ETHINYL ESTRADIOL 150-30(84)
KIT ORAL
Qty: 91 TABLET | Refills: 0 | OUTPATIENT
Start: 2019-11-29

## 2019-12-02 ENCOUNTER — TELEPHONE (OUTPATIENT)
Dept: FAMILY MEDICINE | Facility: CLINIC | Age: 29
End: 2019-12-02

## 2019-12-02 NOTE — TELEPHONE ENCOUNTER
Reason for Call:  Medication or medication refill:    Do you use a [facility]?  Name of the pharmacy and phone number for the current request:  [id number] - [place], [place] - [address] AT [address]  [phone]    Name of the medication requested: vonorgest-eth estrad 91-Day (ASHLYNA) 0.15-0.03 &0.01 MG tablet    Other request:     Can we leave a detailed message on this number? YES    Phone number patient can be reached at: Home number on file [phone] (home)    Best Time:     Call taken on 12/2/2019 at 11:05 AM by Terese Castanon

## 2019-12-02 NOTE — TELEPHONE ENCOUNTER
Release to share Personal Health Information is identified to speak with Jeramy, Legal guardian.  You have remaining refill(s) 10/7/19.  Often time the pharmacy's automated system does not recognize new medications that are sent.  You will need to call and speak with someone directly.    Verbalized good understanding.   Billie Lyn RN

## 2020-02-03 ENCOUNTER — OFFICE VISIT (OUTPATIENT)
Dept: OPHTHALMOLOGY | Facility: CLINIC | Age: 30
End: 2020-02-03
Payer: COMMERCIAL

## 2020-02-03 DIAGNOSIS — H50.00 ESOTROPIA: ICD-10-CM

## 2020-02-03 DIAGNOSIS — Q03.9 CONGENITAL HYDROCEPHALUS (H): ICD-10-CM

## 2020-02-03 DIAGNOSIS — H52.13 MYOPIA OF BOTH EYES: ICD-10-CM

## 2020-02-03 DIAGNOSIS — H52.222 REGULAR ASTIGMATISM OF LEFT EYE: ICD-10-CM

## 2020-02-03 DIAGNOSIS — Z01.00 EXAMINATION OF EYES AND VISION: Primary | ICD-10-CM

## 2020-02-03 PROBLEM — Z91.81 AT HIGH RISK FOR FALLS: Status: ACTIVE | Noted: 2020-02-03

## 2020-02-03 PROCEDURE — 92014 COMPRE OPH EXAM EST PT 1/>: CPT | Performed by: STUDENT IN AN ORGANIZED HEALTH CARE EDUCATION/TRAINING PROGRAM

## 2020-02-03 PROCEDURE — 92015 DETERMINE REFRACTIVE STATE: CPT | Performed by: STUDENT IN AN ORGANIZED HEALTH CARE EDUCATION/TRAINING PROGRAM

## 2020-02-03 ASSESSMENT — VISUAL ACUITY
OS_CC: 3-
OD_CC: 20/60
OS_CC: 20/50
OD_CC: 3-
CORRECTION_TYPE: GLASSES
METHOD: SNELLEN - LINEAR

## 2020-02-03 ASSESSMENT — REFRACTION_MANIFEST
OD_SPHERE: -8.75
OD_CYLINDER: SPHERE
OS_AXIS: 107
OS_CYLINDER: +1.75
OS_SPHERE: -9.50

## 2020-02-03 ASSESSMENT — REFRACTION_WEARINGRX
OS_AXIS: 131
OS_CYLINDER: +0.25
SPECS_TYPE: SVL
OS_SPHERE: -7.25
OD_CYLINDER: SPHERE
OD_SPHERE: -8.25

## 2020-02-03 ASSESSMENT — TONOMETRY
IOP_METHOD: APPLANATION
OD_IOP_MMHG: 12
OS_IOP_MMHG: 12

## 2020-02-03 ASSESSMENT — EXTERNAL EXAM - RIGHT EYE: OD_EXAM: NORMAL

## 2020-02-03 ASSESSMENT — CONF VISUAL FIELD
OD_NORMAL: 1
OS_NORMAL: 1

## 2020-02-03 ASSESSMENT — SLIT LAMP EXAM - LIDS
COMMENTS: NORMAL
COMMENTS: NORMAL

## 2020-02-03 ASSESSMENT — EXTERNAL EXAM - LEFT EYE: OS_EXAM: NORMAL

## 2020-02-03 ASSESSMENT — CUP TO DISC RATIO
OS_RATIO: 0.6
OD_RATIO: 0.6

## 2020-02-03 NOTE — LETTER
2/3/2020         RE: Perlita Molina  [address] [facility] [address]        Dear Colleague,    Thank you for referring your patient, Perlita Molina, to the [facility]. Please see a copy of my visit note below.     Current Eye Medications:  None.       Subjective:  Comprehensive Eye Exam.  No vision changes or concerns.  Patient had a shunt revision in October of 2019.       Objective:  See Ophthalmology Exam.       Assessment:  Perlita Molina is a 29 year old female who presents with:   Encounter Diagnoses   Name Primary?     Examination of eyes and vision      Myopia of both eyes      Regular astigmatism of left eye        RET s/p repair x2 - Both Eyes      Congenital hydrocephalus Shunt revised last year. No optic disc edema at present.        Plan:  Glasses prescription given - optional to update    Litzy Lee MD  [phone]        Again, thank you for allowing me to participate in the care of your patient.        Sincerely,        Litzy Lee MD

## 2020-02-03 NOTE — PROGRESS NOTES
Current Eye Medications:  None.       Subjective:  Comprehensive Eye Exam.  No vision changes or concerns.  Patient had a shunt revision in October of 2019.       Objective:  See Ophthalmology Exam.       Assessment:  Perlita Molina is a 29 year old female who presents with:   Encounter Diagnoses   Name Primary?     Examination of eyes and vision      Myopia of both eyes      Regular astigmatism of left eye        RET s/p repair x2 - Both Eyes      Congenital hydrocephalus Shunt revised last year. No optic disc edema at present.        Plan:  Glasses prescription given - optional to update    Litzy Lee MD  [phone]

## 2020-09-18 DIAGNOSIS — F39 EPISODIC MOOD DISORDER (H): ICD-10-CM

## 2020-09-18 NOTE — TELEPHONE ENCOUNTER
Medication is being filled for 1 time refill only due to:  Patient needs to be seen because it has been more than one year since last visit.   Nadira Wood BSN, RN

## 2020-09-18 NOTE — LETTER
September 18, 2020    Perlita Molina  [address] [facility] [address]    Dear Perlita,       We recently received a refill request for FLUoxetine (PROZAC) 20 MG capsule .  We have refilled this for a one time 90 day supply only because you are due for a:    Medication check office visit      Please call at your earliest convenience so that there will not be a delay with your future refills.          Thank you,   Your [facility] Team/  [phone]

## 2020-10-05 ENCOUNTER — OFFICE VISIT (OUTPATIENT)
Dept: FAMILY MEDICINE | Facility: CLINIC | Age: 30
End: 2020-10-05
Payer: COMMERCIAL

## 2020-10-05 VITALS
HEART RATE: 88 BPM | TEMPERATURE: 98.2 F | WEIGHT: 205 LBS | DIASTOLIC BLOOD PRESSURE: 82 MMHG | OXYGEN SATURATION: 96 % | BODY MASS INDEX: 35 KG/M2 | SYSTOLIC BLOOD PRESSURE: 116 MMHG | HEIGHT: 64 IN

## 2020-10-05 DIAGNOSIS — Z23 NEED FOR PROPHYLACTIC VACCINATION AND INOCULATION AGAINST INFLUENZA: ICD-10-CM

## 2020-10-05 DIAGNOSIS — F39 EPISODIC MOOD DISORDER (H): Primary | ICD-10-CM

## 2020-10-05 DIAGNOSIS — N92.0 EXCESSIVE OR FREQUENT MENSTRUATION: ICD-10-CM

## 2020-10-05 PROCEDURE — 90471 IMMUNIZATION ADMIN: CPT | Performed by: PHYSICIAN ASSISTANT

## 2020-10-05 PROCEDURE — 99213 OFFICE O/P EST LOW 20 MIN: CPT | Mod: 25 | Performed by: PHYSICIAN ASSISTANT

## 2020-10-05 PROCEDURE — 90686 IIV4 VACC NO PRSV 0.5 ML IM: CPT | Performed by: PHYSICIAN ASSISTANT

## 2020-10-05 RX ORDER — LEVONORGESTREL / ETHINYL ESTRADIOL AND ETHINYL ESTRADIOL 150-30(84)
1 KIT ORAL DAILY
Qty: 84 TABLET | Refills: 4 | Status: SHIPPED | OUTPATIENT
Start: 2020-10-05 | End: 2021-10-07

## 2020-10-05 ASSESSMENT — ANXIETY QUESTIONNAIRES
5. BEING SO RESTLESS THAT IT IS HARD TO SIT STILL: NOT AT ALL
3. WORRYING TOO MUCH ABOUT DIFFERENT THINGS: NOT AT ALL
GAD7 TOTAL SCORE: 0
7. FEELING AFRAID AS IF SOMETHING AWFUL MIGHT HAPPEN: NOT AT ALL
4. TROUBLE RELAXING: NOT AT ALL
GAD7 TOTAL SCORE: 0
2. NOT BEING ABLE TO STOP OR CONTROL WORRYING: NOT AT ALL
7. FEELING AFRAID AS IF SOMETHING AWFUL MIGHT HAPPEN: NOT AT ALL
GAD7 TOTAL SCORE: 0
6. BECOMING EASILY ANNOYED OR IRRITABLE: NOT AT ALL
1. FEELING NERVOUS, ANXIOUS, OR ON EDGE: NOT AT ALL

## 2020-10-05 ASSESSMENT — PAIN SCALES - GENERAL: PAINLEVEL: NO PAIN (0)

## 2020-10-05 ASSESSMENT — MIFFLIN-ST. JEOR: SCORE: 1635.9

## 2020-10-05 NOTE — PROGRESS NOTES
"Subjective     Perlita Molina is a 30 year old female who presents to clinic today for the following health issues:    Perlita is here with her mother. She has a history of congential hyrocephalus and developmental delay. She has been at home with her family and no longer able to work with her work program due to COVID19 pandemic. She has been doing well at home, but bored.  Today she will nee refills of her medication.     History of Present Illness       Mental Health Follow-up:  Patient presents to follow-up on Anxiety.    Patient's anxiety since last visit has been:  No change  The patient is not having other symptoms associated with anxiety.  Any significant life events: No  Patient is not feeling anxious or having panic attacks.  Patient has no concerns about alcohol or drug use.     Social History  Tobacco Use    Smoking status: Never Smoker    Smokeless tobacco: Never Used  Alcohol use: No    Alcohol/week: 0.0 standard drinks  Drug use: No      Today's PHQ-9         PHQ-9 Total Score:         PHQ-9 Q9 Thoughts of better off dead/self-harm past 2 weeks :       Thoughts of suicide or self harm:      Self-harm Plan:        Self-harm Action:          Safety concerns for self or others:                         Review of Systems   Constitutional, HEENT, cardiovascular, pulmonary, GI, , musculoskeletal, neuro, skin, endocrine and psych systems are negative, except as otherwise noted.      Objective    /82   Pulse 88   Temp 98.2  F (36.8  C) (Tympanic)   Ht 1.619 m (5' 3.75\")   Wt 93 kg (205 lb)   SpO2 96%   BMI 35.46 kg/m    Body mass index is 35.46 kg/m .  Physical Exam   GENERAL: healthy, alert and no distress  SKIN: no suspicious lesions or rashes  PSYCH: mentation appears normal, affect normal/bright            Assessment & Plan     Episodic mood disorder (H)  Stable, refills given  - FLUoxetine (PROZAC) 20 MG capsule; Take 1 capsule (20 mg) by mouth daily    Excessive or frequent " menstruation  Stable, refills given  - levonorgest-eth estrad 91-Day (ASHLYNA) 0.15-0.03 &0.01 MG tablet; Take 1 tablet by mouth daily Medication taken continuously, menstrual cycle every 3 months    Need for prophylactic vaccination and inoculation against influenza  - INFLUENZA VACCINE IM > 6 MONTHS VALENT IIV4 [77023]  - Vaccine Administration, Initial [27421]     Return in about 1 year (around 10/5/2021) for medication check.    Kristen M. Kehr, PA-C [facility]

## 2020-10-05 NOTE — NURSING NOTE
"Chief Complaint   Patient presents with     Anxiety       Initial /82   Pulse 88   Temp 98.2  F (36.8  C) (Tympanic)   Ht 1.619 m (5' 3.75\")   Wt 93 kg (205 lb)   SpO2 96%   BMI 35.46 kg/m   Estimated body mass index is 35.46 kg/m  as calculated from the following:    Height as of this encounter: 1.619 m (5' 3.75\").    Weight as of this encounter: 93 kg (205 lb).  Medication Reconciliation: complete    ELIAS Peacock MA    "

## 2020-10-06 ASSESSMENT — ANXIETY QUESTIONNAIRES: GAD7 TOTAL SCORE: 0

## 2020-10-14 DIAGNOSIS — N92.0 EXCESSIVE OR FREQUENT MENSTRUATION: ICD-10-CM

## 2020-10-14 RX ORDER — LEVONORGESTREL AND ETHINYL ESTRADIOL AND ETHINYL ESTRADIOL 150-30(84)
KIT ORAL
Qty: 273 TABLET | Refills: 0 | OUTPATIENT
Start: 2020-10-14

## 2020-10-14 NOTE — TELEPHONE ENCOUNTER
10/05/20 Sent (none) Kehr, Kristen M, PABriC [facility]     levonorgest-eth estrad 91-Day (ASHLYNA) 0.15-0.03 &0.01 MG tablet 84 tablet 4 10/5/2020   [id number] - [place], [place] - [address] AT [address]

## 2020-10-25 DIAGNOSIS — K59.01 SLOW TRANSIT CONSTIPATION: ICD-10-CM

## 2020-10-25 NOTE — LETTER
October 26, 2020    Perlita Molina  [address] [place] [facility] [address]    Dear Perlita,       We recently received a refill request for polyethylene glycol (MIRALAX).  We have refilled this for a one time supply only because you are due for a:    Physical office visit      Please call at your earliest convenience so that there will not be a delay with your future refills.          Thank you,   Your [facility] Team/mkr  [phone]

## 2020-10-26 RX ORDER — POLYETHYLENE GLYCOL 3350 17 G/17G
POWDER, FOR SOLUTION ORAL
Qty: 510 G | Refills: 1 | Status: SHIPPED | OUTPATIENT
Start: 2020-10-26 | End: 2020-10-30

## 2020-10-30 ENCOUNTER — TELEPHONE (OUTPATIENT)
Dept: FAMILY MEDICINE | Facility: CLINIC | Age: 30
End: 2020-10-30

## 2020-10-30 DIAGNOSIS — K59.01 SLOW TRANSIT CONSTIPATION: ICD-10-CM

## 2020-10-30 RX ORDER — POLYETHYLENE GLYCOL 3350 17 G/17G
POWDER, FOR SOLUTION ORAL
Qty: 510 G | Refills: 11 | Status: SHIPPED | OUTPATIENT
Start: 2020-10-30 | End: 2021-10-07

## 2020-10-30 NOTE — TELEPHONE ENCOUNTER
Mom markus longo patient received a letter in the mail stating needs to be seen for further refill on her miralax, was only filled for 1 time. Needs to be seen Mom  patient was just seen in clinic on 10/5 and was told all medications were ok for refills. Please call to advise.

## 2020-10-30 NOTE — TELEPHONE ENCOUNTER
Pt was seen on 10/5/20 for mood disorder and menstrual concerns. AVS indicates pt should return in 1 year.  Per chart review, > 1 year since last annual routine health exam.    See message below. On 10/26/20 a sade refill of Miralax was given due to pt is due for annual physical.    Please review and advise if annual physical is required for Miralax refill, or okay to refill x 1 year from date of 10/5/20 office visit.    Cat Gabriel, GABEN, RN

## 2020-11-27 ENCOUNTER — TRANSFERRED RECORDS (OUTPATIENT)
Dept: HEALTH INFORMATION MANAGEMENT | Facility: CLINIC | Age: 30
End: 2020-11-27

## 2020-12-13 ENCOUNTER — HEALTH MAINTENANCE LETTER (OUTPATIENT)
Age: 30
End: 2020-12-13

## 2021-01-04 ENCOUNTER — NURSE TRIAGE (OUTPATIENT)
Dept: FAMILY MEDICINE | Facility: CLINIC | Age: 31
End: 2021-01-04

## 2021-01-04 ENCOUNTER — OFFICE VISIT (OUTPATIENT)
Dept: URGENT CARE | Facility: URGENT CARE | Age: 31
End: 2021-01-04
Payer: COMMERCIAL

## 2021-01-04 VITALS
SYSTOLIC BLOOD PRESSURE: 144 MMHG | HEART RATE: 127 BPM | DIASTOLIC BLOOD PRESSURE: 86 MMHG | RESPIRATION RATE: 18 BRPM | OXYGEN SATURATION: 95 % | TEMPERATURE: 98.8 F

## 2021-01-04 DIAGNOSIS — M25.561 ACUTE PAIN OF RIGHT KNEE: Primary | ICD-10-CM

## 2021-01-04 PROCEDURE — 99213 OFFICE O/P EST LOW 20 MIN: CPT | Performed by: PHYSICIAN ASSISTANT

## 2021-01-04 ASSESSMENT — ENCOUNTER SYMPTOMS
WOUND: 0
MYALGIAS: 0
FEVER: 0
HEADACHES: 0
RHINORRHEA: 0
WEAKNESS: 0
NECK STIFFNESS: 0
ENDOCRINE NEGATIVE: 1
COUGH: 0
BRUISES/BLEEDS EASILY: 0
ALLERGIC/IMMUNOLOGIC NEGATIVE: 1
BACK PAIN: 0
DIZZINESS: 0
ARTHRALGIAS: 1
SORE THROAT: 0
VOMITING: 0
LIGHT-HEADEDNESS: 0
EYES NEGATIVE: 1
SHORTNESS OF BREATH: 0
RESPIRATORY NEGATIVE: 1
HEMATOLOGIC/LYMPHATIC NEGATIVE: 1
CARDIOVASCULAR NEGATIVE: 1
PALPITATIONS: 0
CHILLS: 0
NAUSEA: 0
NECK PAIN: 0
DIARRHEA: 0
JOINT SWELLING: 0

## 2021-01-04 NOTE — TELEPHONE ENCOUNTER
"Transferred to [facility] nurse to schedule; mom prefers to be seen today or tomorrow or will go to .     Cherry Velez RN      Additional Information    Patient wants to be seen    Answer Assessment - Initial Assessment Questions  1. SYMPTOM: \"What is the main symptom you are concerned about?\" (e.g., weakness, numbness)      Knee weakness on one side  2. ONSET: \"When did this start?\" (minutes, hours, days; while sleeping)      Started on 12/25; seen on Saturday in ER for weakness/pain  3. LAST NORMAL: \"When was the last time you were normal (no symptoms)?\"      Before 12/25  4. PATTERN \"Does this come and go, or has it been constant since it started?\"  \"Is it present now?\"      constant  5. CARDIAC SYMPTOMS: \"Have you had any of the following symptoms: chest pain, difficulty breathing, palpitations?\"      none  6. NEUROLOGIC SYMPTOMS: \"Have you had any of the following symptoms: headache, dizziness, vision loss, double vision, changes in speech, unsteady on your feet?\"      None that mom knows of; is unsteady on her feet even while resting  7. OTHER SYMPTOMS: \"Do you have any other symptoms?\"      no  8. PREGNANCY: \"Is there any chance you are pregnant?\" \"When was your last menstrual period?\"      no    Protocols used: NEUROLOGIC DEFICIT-A-OH      "

## 2021-01-04 NOTE — PROGRESS NOTES
"Chief Complaint:    Chief Complaint   Patient presents with     Knee Pain     Fell this morning about twice today, hit both knees, complaining of right knee pain.       HPI: Perlita Molina is an 30 year old female with a history of congenital hydrocephalus s/p cerebral shunt, and developmental delay, who presents for evaluation and treatment of R knee pain. Patient is here with her father, who provides most of her history. Patient's father reports she was startled on Kayla day and had a mechanical fall onto her right hip. The following day she complained of right hip pain and her father reports several falls in which her knee buckled and she fell backwards. She was taken to the ER where she was evaluated with CT head and CT scan of the right hip, which were both negative. Pain has been controlled on daily ibuprofen. Her father reports several more instances of mechanical falls in which her knee appeared to \"give out,\" including one today. Perlita has now been complaining of some pain in her R knee over the last 2 days.  She denies episodes of knee \"catching or locking,\" stiffness, numbness/tingling, or weakness.     ROS:      Review of Systems   Constitutional: Negative for chills and fever.   HENT: Negative for congestion, ear pain, rhinorrhea and sore throat.    Eyes: Negative.    Respiratory: Negative.  Negative for cough and shortness of breath.    Cardiovascular: Negative.  Negative for chest pain and palpitations.   Gastrointestinal: Negative for diarrhea, nausea and vomiting.   Endocrine: Negative.    Genitourinary: Negative.    Musculoskeletal: Positive for arthralgias. Negative for back pain, joint swelling, myalgias, neck pain and neck stiffness.   Skin: Negative.  Negative for rash and wound.   Allergic/Immunologic: Negative.  Negative for immunocompromised state.   Neurological: Negative for dizziness, weakness, light-headedness and headaches.   Hematological: Negative.  Does not bruise/bleed easily. 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   Social Needs     Financial resource strain: Not on file     Food insecurity     Worry: Not on file     Inability: Not on file     Transportation needs     Medical: Not on file     Non-medical: Not on file   Tobacco Use     Smoking status: Never Smoker     Smokeless tobacco: Never Used   Substance and Sexual Activity     Alcohol use: No     Alcohol/week: 0.0 standard drinks     Drug use: No     Sexual activity: Never   Lifestyle     Physical activity     Days per week: Not on file     Minutes per session: Not on file     Stress: Not on file   Relationships     Social connections     Talks on phone: Not on file     Gets together: Not on file     Attends Islam service: Not on file     Active member of club or organization: Not on file     Attends meetings of clubs or organizations: Not on file     Relationship status: Not on file     Intimate partner violence     Fear of current or ex partner: Not on file     Emotionally abused: Not on file     Physically abused: Not on file     Forced sexual activity: Not on file   Other Topics Concern     Parent/sibling w/ CABG, MI or angioplasty before 65F 55M? No   Social History Narrative     Not on file        Surgical History:  Past Surgical History:   Procedure Laterality Date     BIOPSY  August 2017    Awaiting results     EYE SURGERY  age 4 or 5    strabismus      IMPLANT SHUNT VENTRICULOPERITONEAL  1 week of age    for  hydrocephalus; had last revision at age 3     STRABISMUS SURGERY       SURGICAL HISTORY OF -   by age 5    strabismus surgery x2        Problem List:  Patient Active Problem List   Diagnosis     RET s/p repair x2     Congenital hydrocephalus (H)     Developmental delay     PCOS (polycystic ovarian syndrome)     Nonfunctioning ventriculoperitoneal shunt (H)     Episodic mood disorder (H)     Hearing loss     Constipation     CARDIOVASCULAR SCREENING; LDL GOAL LESS THAN 160     Excessive or frequent menstruation     Anxiety     Congenital jaw deformity     ACP (advance care planning)     At high risk for falls        Allergies:  No Known Allergies     Current Meds:    Current Outpatient Medications:      FLUoxetine (PROZAC) 20 MG capsule, Take 1 capsule (20 mg) by mouth daily, Disp: 30 capsule, Rfl: 11     levonorgest-eth estrad 91-Day (ASHLYNA) 0.15-0.03 &0.01 MG tablet, Take 1 tablet by mouth daily Medication taken continuously, menstrual cycle every 3 months, Disp: 84 tablet, Rfl: 4     Multiple Vitamin (DAILY MULTIVITAMIN PO), Take  by mouth., Disp: , Rfl:      polyethylene glycol (MIRALAX) 17 GM/Dose powder, USE 1/2 TO 1 CAPFUL IN 8 OZ OF BEVERAGE AND DRINK ONCE DAILY, Disp: 510 g, Rfl: 11     PHYSICAL EXAM:     Vital signs noted and reviewed by Jeramy Grant PA-C  BP (!) 144/86 (BP Location: Left arm, Patient Position: Sitting, Cuff Size: Adult Regular)   Pulse 127   Temp 98.8  F (37.1  C) (Tympanic)   Resp 18   SpO2 95%   Breastfeeding No        Physical Exam  Vitals signs and nursing note reviewed.   Constitutional:       General: She is not in acute distress.     Appearance: She is well-developed. She is not ill-appearing, toxic-appearing or diaphoretic.   HENT:      Head: Normocephalic and atraumatic.      Right Ear: Tympanic membrane and external ear normal. No drainage, swelling or tenderness. Tympanic membrane is not perforated, erythematous, retracted or bulging.      Left Ear: Tympanic membrane  and external ear normal. No drainage, swelling or tenderness. Tympanic membrane is not perforated, erythematous, retracted or bulging.      Nose: No mucosal edema, congestion or rhinorrhea.      Right Sinus: No maxillary sinus tenderness or frontal sinus tenderness.      Left Sinus: No maxillary sinus tenderness or frontal sinus tenderness.      Mouth/Throat:      Pharynx: No pharyngeal swelling, oropharyngeal exudate, posterior oropharyngeal erythema or uvula swelling.      Tonsils: No tonsillar abscesses.   Eyes:      Pupils: Pupils are equal, round, and reactive to light.   Neck:      Musculoskeletal: Full passive range of motion without pain, normal range of motion and neck supple.      Trachea: Trachea normal.   Cardiovascular:      Rate and Rhythm: Normal rate and regular rhythm.      Heart sounds: Normal heart sounds, S1 normal and S2 normal. No murmur. No friction rub. No gallop.    Pulmonary:      Effort: Pulmonary effort is normal. No respiratory distress.      Breath sounds: Normal breath sounds. No decreased breath sounds, wheezing, rhonchi or rales.   Abdominal:      General: Bowel sounds are normal. There is no distension.      Palpations: Abdomen is soft. Abdomen is not rigid. There is no mass.      Tenderness: There is no abdominal tenderness. There is no guarding or rebound.   Musculoskeletal:      Comments: Full ROM of the R knee, non-erythematous, no joint effusion. No TTP over medial/lateral joint line. Lachman, Valgus/Varus, Valarie, and Posterior Drawer negative. No pain in hip or right ankle.    Lymphadenopathy:      Cervical: No cervical adenopathy.   Skin:     General: Skin is warm and dry.   Neurological:      Mental Status: She is alert and oriented to person, place, and time.      Cranial Nerves: No cranial nerve deficit.      Motor: No weakness.      Deep Tendon Reflexes: Reflexes are normal and symmetric.   Psychiatric:         Behavior: Behavior normal. Behavior is cooperative.          Thought Content: Thought content normal.         Judgment: Judgment normal.       Labs:     No results found for any visits on 01/04/21.    Medical Decision Making:    Differential Diagnosis:  MS Injury Pain: sprain, fracture, tendonitis, muscle strain and contusion.    ASSESSMENT:     1. Acute pain of right knee      PLAN:  Low suspicion for ligament injury, fracture, or inflammatory process based on examination today. Discussed MRI, which would not be beneficial today. Educated patient on going to PT or observing the knee over the next several weeks, and patient opted to observe. Educated on taking ibuprofen or icing if the knee becomes more painful.    Patient and father instructed to follow up with PCP in 1 week if symptoms are not improving for consideration of PT.  Sooner if symptoms worsen.  Worrisome symptoms discussed with instructions to go to the ED.  Patient and father verbalized understanding and agreed with this plan.     Jeramy Grant PA-C  1/4/2021, 1:26 PM

## 2021-01-04 NOTE — TELEPHONE ENCOUNTER
Patient's mom was transferred to this RN to decide if patient should be seen or go to urgent care. I huddled with Dr. Stephane Conte who has an opening today. He feels it is best for patient to see PCP tomorrow due to cognitive disability.  Informed mom I can get patient in tomorrow morning with PCP but she said patient is in too much pain and needs to be seen today.  Advised [facility] urgent care now. Mom agrees and will bring her now.     Vida BERNALN, RN

## 2021-01-05 ENCOUNTER — OFFICE VISIT (OUTPATIENT)
Dept: FAMILY MEDICINE | Facility: CLINIC | Age: 31
End: 2021-01-05
Payer: COMMERCIAL

## 2021-01-05 VITALS
OXYGEN SATURATION: 96 % | HEART RATE: 116 BPM | SYSTOLIC BLOOD PRESSURE: 122 MMHG | DIASTOLIC BLOOD PRESSURE: 72 MMHG | TEMPERATURE: 98.5 F

## 2021-01-05 DIAGNOSIS — M62.81 GENERALIZED MUSCLE WEAKNESS: Primary | ICD-10-CM

## 2021-01-05 DIAGNOSIS — R29.6 RECURRENT FALLS: ICD-10-CM

## 2021-01-05 PROCEDURE — 99213 OFFICE O/P EST LOW 20 MIN: CPT | Performed by: PHYSICIAN ASSISTANT

## 2021-01-05 ASSESSMENT — PAIN SCALES - GENERAL: PAINLEVEL: NO PAIN (0)

## 2021-01-05 NOTE — NURSING NOTE
"Chief Complaint   Patient presents with     Second Opinion       Initial /72   Pulse 116   Temp 98.5  F (36.9  C) (Tympanic)   SpO2 96%  Estimated body mass index is 35.46 kg/m  as calculated from the following:    Height as of 10/5/20: 1.619 m (5' 3.75\").    Weight as of 10/5/20: 93 kg (205 lb).  Medication Reconciliation: complete    ELIAS Peacock MA    "

## 2021-01-05 NOTE — PROGRESS NOTES
Assessment & Plan     Generalized muscle weakness  Recurrent falls  I don't see an indication to MRI or Xray any particular joints at this time. I believe her falls are due to generalized weakness and inactivity over this COVID19 restriction time. I am going to see if she can get some help with exercises and gait training with Physical Therapy. Mother or father will need to be present at her therapy sessions in order to help with instructions and learning what they can help her with at home also. They are very willing and cooperative in helping Perlita with this. They will also contact me if they have any further concerns or she is having more falls.   - [facility], AND CHIROPRACTIC REFERRAL; Future    Review of external notes as documented above   Review of prior external note(s) from - Outside records from [facility] ER    Discussion of management or test interpretation with external physician/other qualified healthcare professional/appropriate source - CT scan and imaging done in the ER reviewed.              No follow-ups on file.    Kristen M. Kehr, PA-C  [facility] [place]    Subjective     Perlita Molina is a 30 year old female who presents to clinic today for the following health issues     HPI         Opinions regarding physical therapy.       Perlita is here today with her father.   She has been having more falls within the past month. She was seen in the ER and had a CT of her head and there were no changes with her  shunt. She had hip Xrays and had an evaluation of her knee yesterday in Urgent Care. Her father is helpful with her history and believes she is typically stiff in the morning and that leads to some difficulty with her gait. Since she was seen in the , she has been using his walker that he had after hip surgery for security of getting around in their home. She does not complain of pain in a specific joint, but thinks both knees can be sore at times. Before COVID19 restrictions, she  was active and able to get out of the home, but that stopped. She has been at home, but inactive. This is leading to more falls. She was given a wheelchair at the front door to help get her into the clinic.         Review of Systems   Constitutional, HEENT, cardiovascular, pulmonary, GI, , musculoskeletal, neuro, skin, endocrine and psych systems are negative, except as otherwise noted.      Objective    /72   Pulse 116   Temp 98.5  F (36.9  C) (Tympanic)   SpO2 96%   There is no height or weight on file to calculate BMI.  Physical Exam   GENERAL: healthy, alert and no distress  MS: bilateral knees: normal inspection. There is no tenderness bilaterally and no palpable fluid in the knees. She is able to balance, but needs assistance to get out of the wheelchair. She also needs assistance to get her on the exam table. She is able to get up from a half squatted position, but not comfortable bending more than that in order to get up on her own. She uses assistance of myself and her father to get on the exam table. Meniscal testing and ligament testing is normal in both knees. She has no pain or tenderness with knee exam bilaterally.   SKIN: no suspicious lesions or rashes  NEURO: weakness of extremities / generalized, decreased tone.   mentation intact, she has poor insight due to cognitive delay  PSYCH: mentation appears normal, affect normal/bright

## 2021-01-12 ENCOUNTER — HOSPITAL ENCOUNTER (OUTPATIENT)
Dept: PHYSICAL THERAPY | Facility: CLINIC | Age: 31
Setting detail: THERAPIES SERIES
End: 2021-01-12
Attending: PHYSICIAN ASSISTANT
Payer: COMMERCIAL

## 2021-01-12 DIAGNOSIS — M62.81 GENERALIZED MUSCLE WEAKNESS: ICD-10-CM

## 2021-01-12 DIAGNOSIS — R29.6 RECURRENT FALLS: ICD-10-CM

## 2021-01-12 PROCEDURE — 97110 THERAPEUTIC EXERCISES: CPT | Mod: GP | Performed by: PHYSICAL THERAPIST

## 2021-01-12 PROCEDURE — 97162 PT EVAL MOD COMPLEX 30 MIN: CPT | Mod: GP | Performed by: PHYSICAL THERAPIST

## 2021-01-15 NOTE — PROGRESS NOTES
01/12/21 1400   Quick Adds   Type of Visit Initial OP PT Evaluation   General Information   Start of Care Date 01/12/21   Referring Physician Kehr, Kristen M, PA-C   Orders Evaluate and Treat as Indicated   Order Date 01/05/21   Medical Diagnosis Generalized Muscle Weakness   Onset of illness/injury or Date of Surgery 09/12/20   Surgical/Medical history reviewed Yes   Pertinent history of current problem (include personal factors and/or comorbidities that impact the POC) No falls in the past two weeks with the last one being right after [place] and fell on the right side hip and then fell again the next morning and went to ER for full work up.  She then fell 1more time in the BR and then went to urgent care.   Pertinent Visual History  Eye prescription and no dizziness   Current Community Support Other/Comments  ( )   Living environment House/townhome   Home/Community Accessibility Comments Stairs with rail split level   Current Assistive Devices Four Wheeled Walker   ADL Devices Shower/Tub Chair   Patient/Family Goals Statement Improve balance.   General Information Comments She like to go out to eat, likes to play games, likes to go bowling, likes music   Fall Risk Screen   Fall screen completed by PT   Have you fallen 2 or more times in the past year? Yes   Have you fallen and had an injury in the past year? No   Timed Up and Go score (seconds) 10.6   Is patient a fall risk? Yes   Fall screen comments Increased risk for falls   Pain   Patient currently in pain Denies   Vitals Signs   Vital Signs Comments weekly basis   Cognitive Status Examination   Orientation orientation to person, place and time   Follows Commands and Answers Questions 100% of the time;able to follow single-step instructions   Cognitive Comment Cognitive defecits with father present to assist with medical history.   Strength   Strength Comments Bilateral ankle dorsiflexion=4/5, bilateral knee flexion= 4+/5,    Gait Special Tests    Gait Special Tests 25 FOOT TIMED WALK   Gait Special Tests 25 Foot Timed Walk   Seconds 7.78   Steps 18 Steps   Balance   Balance other (describe)   Balance Special Tests   Balance Special Tests Sit to stand reps   Balance Special Tests Sit to Stand Reps in 30 Seconds   Reps in 30 seconds 8   Height 19   Sensory Examination   Sensory Perception no deficits were identified   Planned Therapy Interventions   Planned Therapy Interventions balance training;gait training;neuromuscular re-education;ROM;strengthening;stretching;transfer training   Clinical Impression   Criteria for Skilled Therapeutic Interventions Met yes, treatment indicated   PT Diagnosis Impaired balance and gait   Influenced by the following impairments Impaired balance, weakness, impaired posture, decreased cognition, impaired righting reflexes, fatigue, deconditioning due to being sedentary lifestyle   Functional limitations due to impairments Increased risk for falls, assistance needed for safety with supervision, decreased tolerance to functional mobility due to being homebound   Clinical Presentation Evolving/Changing   Clinical Presentation Rationale Decline in activity due to being homebound, clinical presnetaiton, symptom report   Clinical Decision Making (Complexity) Moderate complexity   Therapy Frequency 1 time/week   Predicted Duration of Therapy Intervention (days/wks) 12 weeks   Risk & Benefits of therapy have been explained Yes   Patient, Family & other staff in agreement with plan of care Yes   Clinical Impression Comments Perlita presents with weakness and impaired balance resulting in increased falls due to deconditioning with being more homebound due to day program closing secondary to COVID restrictions.  She will benefit from skilled PT for strengthening, balance activities and fall reduction education.   Education Assessment   Barriers to Learning Cognitive   GOALS   PT Eval Goals 1;2;3;4   Goal 1   Goal Identifier Transfers    Goal Description Perlita will be able to demonstrate independence with a toilet transsfer including dressing cares to return to baseline with safe sequence demonstrated.   Target Date 04/15/21   Goal 2   Goal Identifier stairs   Goal Description Perlita will negotiate 8 steps with rail and one UE assistance with safe sequence in 3/3 tirals.   Target Date 04/15/21   Goal 3   Goal Identifier 3 minute walk   Goal Description Perlita will demonstrate an improvement of 220 feet or better on the 3 Minute Walk test indicating improved tolerance for gait.   Target Date 04/15/21   Goal 4   Goal Identifier falls   Goal Description Perlita and her family will report no falls in a one month time frame with completed education in safe set up at home.   Target Date 04/15/21   Total Evaluation Time   PT [place], Moderate Complexity Minutes (35836) 30

## 2021-01-21 ENCOUNTER — HOSPITAL ENCOUNTER (OUTPATIENT)
Dept: PHYSICAL THERAPY | Facility: CLINIC | Age: 31
Setting detail: THERAPIES SERIES
End: 2021-01-21
Attending: PHYSICIAN ASSISTANT
Payer: COMMERCIAL

## 2021-01-21 PROCEDURE — 97112 NEUROMUSCULAR REEDUCATION: CPT | Mod: GP | Performed by: PHYSICAL THERAPIST

## 2021-01-21 PROCEDURE — 97110 THERAPEUTIC EXERCISES: CPT | Mod: GP | Performed by: PHYSICAL THERAPIST

## 2021-01-26 ENCOUNTER — HOSPITAL ENCOUNTER (OUTPATIENT)
Dept: PHYSICAL THERAPY | Facility: CLINIC | Age: 31
Setting detail: THERAPIES SERIES
End: 2021-01-26
Attending: PHYSICIAN ASSISTANT
Payer: COMMERCIAL

## 2021-01-26 PROCEDURE — 97112 NEUROMUSCULAR REEDUCATION: CPT | Mod: GP | Performed by: PHYSICAL THERAPIST

## 2021-01-26 PROCEDURE — 97110 THERAPEUTIC EXERCISES: CPT | Mod: GP | Performed by: PHYSICAL THERAPIST

## 2021-02-02 ENCOUNTER — HOSPITAL ENCOUNTER (OUTPATIENT)
Dept: PHYSICAL THERAPY | Facility: CLINIC | Age: 31
Setting detail: THERAPIES SERIES
End: 2021-02-02
Attending: PHYSICIAN ASSISTANT
Payer: COMMERCIAL

## 2021-02-02 PROCEDURE — 97110 THERAPEUTIC EXERCISES: CPT | Mod: GP | Performed by: PHYSICAL THERAPIST

## 2021-02-02 PROCEDURE — 97116 GAIT TRAINING THERAPY: CPT | Mod: GP | Performed by: PHYSICAL THERAPIST

## 2021-02-09 ENCOUNTER — HOSPITAL ENCOUNTER (OUTPATIENT)
Dept: PHYSICAL THERAPY | Facility: CLINIC | Age: 31
Setting detail: THERAPIES SERIES
End: 2021-02-09
Attending: PHYSICIAN ASSISTANT
Payer: COMMERCIAL

## 2021-02-09 PROCEDURE — 97110 THERAPEUTIC EXERCISES: CPT | Mod: GP | Performed by: PHYSICAL THERAPIST

## 2021-03-09 ENCOUNTER — HOSPITAL ENCOUNTER (OUTPATIENT)
Dept: PHYSICAL THERAPY | Facility: CLINIC | Age: 31
Setting detail: THERAPIES SERIES
End: 2021-03-09
Attending: PHYSICIAN ASSISTANT
Payer: COMMERCIAL

## 2021-03-09 PROCEDURE — 97110 THERAPEUTIC EXERCISES: CPT | Mod: GP | Performed by: PHYSICAL THERAPIST

## 2021-03-09 PROCEDURE — 97112 NEUROMUSCULAR REEDUCATION: CPT | Mod: GP | Performed by: PHYSICAL THERAPIST

## 2021-03-09 PROCEDURE — 97530 THERAPEUTIC ACTIVITIES: CPT | Mod: GP | Performed by: PHYSICAL THERAPIST

## 2021-03-09 NOTE — PROGRESS NOTES
Outpatient Physical Therapy Discharge Note     Patient: Perlita Molina  : 1990    Beginning/End Dates of Reporting Period:  2021 to 3/9/2021    Referring Provider: Kehr, Kristen M, PA-C    Therapy Diagnosis: Impaired balance and gait     Client Self Report: Patient here with her dad who reports no changes. She has not had any falls and has been working on bending over to pick things up at home, still has some difficulty but is progressing. Dad reports that toileting is going well at home and now that the weather is getting nicer, they plan to walk outside more.     Objective Measurements:  Objective Measure: 6MWT   Details: 1077 feet with good stability, minimal head shaking     Goals:  Goal Identifier Transfers   Goal Description Perlita will be able to demonstrate independence with a toilet transsfer including dressing cares to return to baseline with safe sequence demonstrated.   Target Date 04/15/21   Date Met  21   Progress: Goal met     Goal Identifier stairs   Goal Description Perlita will negotiate 8 steps with rail and one UE assistance with safe sequence in 3/3 tirals.   Target Date 04/15/21   Date Met  21   Progress: Goal met     Goal Identifier 3 minute walk   Goal Description Perlita will demonstrate an improvement of 220 feet or better on the 3 Minute Walk test indicating improved tolerance for gait.   Target Date 04/15/21   Date Met      Progress: Minimal change in functional activity tolerance compared to previously measured gait distance.      Goal Identifier falls   Goal Description Perlita and her family will report no falls in a one month time frame with completed education in safe set up at home.   Target Date 04/15/21   Date Met  21   Progress: Goal met       Progress Toward Goals:   Progress this reporting period: Patient demonstrates good progress toward most goals with ability to complete toilet transfers independently per report from dad. Patient and dad also deny any  falls and endorse improvements in functional mobility and ADLs at home. Patient demonstrates minimal functional change in activity tolerance per the 6 MWT but demonstrates improved tolerance during sessions with less breaks required for fatigue to resolve. Patient demonstrates ability to safely negotiate a set of 4 stairs 9x with 1 UE support and no instability.     Plan:  Discharge from therapy.    Discharge:    Reason for Discharge: Patient has met all goals.    Equipment Issued: None    Discharge Plan: Patient to continue home program.

## 2021-04-08 NOTE — PROGRESS NOTES
Rehabilitation Services        OUTPATIENT PHYSICAL THERAPY FUNCTIONAL EVALUATION  PLAN OF TREATMENT FOR OUTPATIENT REHABILITATION  (COMPLETE FOR INITIAL CLAIMS ONLY)  Patient's Last Name, First Name, M.I.  YOB: 1990  Perlita Molina     Provider's Name   Shaunna Boles PT   Medical Record No.  [id number]     Start of Care Date:  01/12/21   Onset Date:  09/12/20   Type:     _X__PT   ____OT  ____SLP Medical Diagnosis:   Generalized Muscle Weakness     PT Diagnosis:  Impaired balance and gait Visits from SOC:  1                              __________________________________________________________________________________  Plan of Treatment/Functional Goals:  balance training, gait training, neuromuscular re-education, ROM, strengthening, stretching, transfer training           GOALS  Transfers  Perlita will be able to demonstrate independence with a toilet transsfer including dressing cares to return to baseline with safe sequence demonstrated.  04/15/21    stairs  Perlita will negotiate 8 steps with rail and one UE assistance with safe sequence in 3/3 tirals.  04/15/21    3 minute walk  Perlita will demonstrate an improvement of 220 feet or better on the 3 Minute Walk test indicating improved tolerance for gait.  04/15/21    falls  Perlita and her family will report no falls in a one month time frame with completed education in safe set up at home.  04/15/21                                 Kristen Kehr PA-C             Therapy Frequency:  1 time/week   Predicted Duration of Therapy Intervention:  90 days    Sara Dooley, PT Kristen Kehr PA-C                                  I CERTIFY THE NEED FOR THESE SERVICES FURNISHED UNDER        THIS PLAN OF TREATMENT AND WHILE UNDER MY CARE     (Physician co-signature of this document indicates review and certification of the therapy plan).          Kristen Kehr  JOSE      Certification Date From:  1/12/2021    Certification Date To:   4/11/2021    Referring Provider:  Kehr, Kristen M, PA-C    Initial Assessment  See Epic Evaluation- Start of Care Date: 01/12/21

## 2021-06-17 ENCOUNTER — OFFICE VISIT (OUTPATIENT)
Dept: OPHTHALMOLOGY | Facility: CLINIC | Age: 31
End: 2021-06-17
Payer: COMMERCIAL

## 2021-06-17 DIAGNOSIS — Z01.00 EXAMINATION OF EYES AND VISION: Primary | ICD-10-CM

## 2021-06-17 DIAGNOSIS — H52.222 REGULAR ASTIGMATISM OF LEFT EYE: ICD-10-CM

## 2021-06-17 DIAGNOSIS — H50.00 ESOTROPIA: ICD-10-CM

## 2021-06-17 DIAGNOSIS — H52.13 MYOPIA OF BOTH EYES: ICD-10-CM

## 2021-06-17 DIAGNOSIS — Q03.9 CONGENITAL HYDROCEPHALUS (H): ICD-10-CM

## 2021-06-17 PROCEDURE — 92015 DETERMINE REFRACTIVE STATE: CPT | Performed by: STUDENT IN AN ORGANIZED HEALTH CARE EDUCATION/TRAINING PROGRAM

## 2021-06-17 PROCEDURE — 92014 COMPRE OPH EXAM EST PT 1/>: CPT | Performed by: STUDENT IN AN ORGANIZED HEALTH CARE EDUCATION/TRAINING PROGRAM

## 2021-06-17 ASSESSMENT — VISUAL ACUITY
OD_CC: 20/60
OD_PH_CC+: -2
OD_PH_CC: 20/50
CORRECTION_TYPE: GLASSES
METHOD: SNELLEN - LINEAR
OD_CC+: +1
OS_CC: 20/50
OS_PH_CC: 20/40
OS_CC+: -1

## 2021-06-17 ASSESSMENT — CONF VISUAL FIELD
OD_NORMAL: 1
OS_INFERIOR_NASAL_RESTRICTION: 3

## 2021-06-17 ASSESSMENT — EXTERNAL EXAM - RIGHT EYE: OD_EXAM: NORMAL

## 2021-06-17 ASSESSMENT — EXTERNAL EXAM - LEFT EYE: OS_EXAM: NORMAL

## 2021-06-17 ASSESSMENT — REFRACTION_WEARINGRX
OD_SPHERE: -8.25
OS_CYLINDER: +0.50
SPECS_TYPE: SVL
OS_AXIS: 128
OD_CYLINDER: SPHERE
OS_SPHERE: -7.25

## 2021-06-17 ASSESSMENT — TONOMETRY
IOP_METHOD: APPLANATION
OS_IOP_MMHG: 16
OD_IOP_MMHG: 10

## 2021-06-17 ASSESSMENT — REFRACTION_MANIFEST
OS_SPHERE: -9.25
OS_AXIS: 105
OD_CYLINDER: +1.25
OD_AXIS: 106
OS_CYLINDER: +1.25
OD_SPHERE: -10.25

## 2021-06-17 ASSESSMENT — CUP TO DISC RATIO
OD_RATIO: 0.6
OS_RATIO: 0.6

## 2021-06-17 ASSESSMENT — SLIT LAMP EXAM - LIDS
COMMENTS: NORMAL
COMMENTS: NORMAL

## 2021-06-17 NOTE — LETTER
6/17/2021         RE: Perlita Molina  [address] [facility] [address]        Dear Colleague,    Thank you for referring your patient, Perlita Molina, to the [facility]. Please see a copy of my visit note below.     Current Eye Medications: none     Subjective:  Here for complete eye exam. Not having any eye problems.     Objective:  See Ophthalmology Exam.       Assessment:  Perlita Molina is a 31 year old female who presents with:   Encounter Diagnoses   Name Primary?     Examination of eyes and vision      Myopia of both eyes      Regular astigmatism of left eye        RET s/p repair x2 - Both Eyes      Congenital hydrocephalus      Stable eye exam.    Plan:  Glasses prescription given    Litzy Lee MD  [phone]          Again, thank you for allowing me to participate in the care of your patient.        Sincerely,        Litzy Lee MD

## 2021-06-17 NOTE — PROGRESS NOTES
Current Eye Medications: none     Subjective:  Here for complete eye exam. Not having any eye problems.     Objective:  See Ophthalmology Exam.       Assessment:  Perlita Molina is a 31 year old female who presents with:   Encounter Diagnoses   Name Primary?     Examination of eyes and vision      Myopia of both eyes      Regular astigmatism of left eye        RET s/p repair x2 - Both Eyes      Congenital hydrocephalus      Stable eye exam.    Plan:  Glasses prescription given    Litzy Lee MD  [phone]

## 2021-09-26 ENCOUNTER — HEALTH MAINTENANCE LETTER (OUTPATIENT)
Age: 31
End: 2021-09-26

## 2021-10-07 ENCOUNTER — OFFICE VISIT (OUTPATIENT)
Dept: FAMILY MEDICINE | Facility: CLINIC | Age: 31
End: 2021-10-07
Payer: COMMERCIAL

## 2021-10-07 VITALS
WEIGHT: 191 LBS | HEIGHT: 64 IN | SYSTOLIC BLOOD PRESSURE: 119 MMHG | TEMPERATURE: 98.8 F | OXYGEN SATURATION: 98 % | BODY MASS INDEX: 32.61 KG/M2 | DIASTOLIC BLOOD PRESSURE: 82 MMHG | HEART RATE: 96 BPM

## 2021-10-07 DIAGNOSIS — F39 EPISODIC MOOD DISORDER (H): Primary | ICD-10-CM

## 2021-10-07 DIAGNOSIS — K59.01 SLOW TRANSIT CONSTIPATION: ICD-10-CM

## 2021-10-07 DIAGNOSIS — N92.0 EXCESSIVE OR FREQUENT MENSTRUATION: ICD-10-CM

## 2021-10-07 DIAGNOSIS — Z23 NEED FOR PROPHYLACTIC VACCINATION AND INOCULATION AGAINST INFLUENZA: ICD-10-CM

## 2021-10-07 PROCEDURE — 90686 IIV4 VACC NO PRSV 0.5 ML IM: CPT | Performed by: PHYSICIAN ASSISTANT

## 2021-10-07 PROCEDURE — 99214 OFFICE O/P EST MOD 30 MIN: CPT | Mod: 25 | Performed by: PHYSICIAN ASSISTANT

## 2021-10-07 PROCEDURE — 90471 IMMUNIZATION ADMIN: CPT | Performed by: PHYSICIAN ASSISTANT

## 2021-10-07 RX ORDER — LEVONORGESTREL / ETHINYL ESTRADIOL AND ETHINYL ESTRADIOL 150-30(84)
1 KIT ORAL DAILY
Qty: 84 TABLET | Refills: 4 | Status: SHIPPED | OUTPATIENT
Start: 2021-10-07 | End: 2022-10-25

## 2021-10-07 RX ORDER — POLYETHYLENE GLYCOL 3350 17 G/17G
POWDER, FOR SOLUTION ORAL
Qty: 510 G | Refills: 11 | Status: SHIPPED | OUTPATIENT
Start: 2021-10-07 | End: 2022-10-25

## 2021-10-07 ASSESSMENT — MIFFLIN-ST. JEOR: SCORE: 1571.37

## 2021-10-07 ASSESSMENT — PAIN SCALES - GENERAL: PAINLEVEL: NO PAIN (0)

## 2021-10-07 NOTE — PROGRESS NOTES
"    Assessment & Plan     Episodic mood disorder (H)  Stable, refills given x 1year  - FLUoxetine (PROZAC) 20 MG capsule; Take 1 capsule (20 mg) by mouth daily    Excessive or frequent menstruation  Stable, refills given   - levonorgest-eth estrad 91-Day (ASHLYNA) 0.15-0.03 &0.01 MG tablet; Take 1 tablet by mouth daily Medication taken continuously, menstrual cycle every 3 months    Slow transit constipation  Stable,   - polyethylene glycol (MIRALAX) 17 GM/Dose powder; USE 1/2 TO 1 CAPFUL IN 8 OZ OF BEVERAGE AND DRINK ONCE DAILY    Need for prophylactic vaccination and inoculation against influenza      She continues to work on weight loss and daily exercises. 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                  BMI:   Estimated body mass index is 32.79 kg/m  as calculated from the following:    Height as of this encounter: 1.626 m (5' 4\").    Weight as of this encounter: 86.6 kg (191 lb).   Weight management plan: Discussed healthy diet and exercise guidelines        Return in about 1 year (around 10/7/2022) for Routine Visit.    Kristen M. Kehr, PA-C  [facility]   Perlita is a 31 year old who presents for the following health issues     Perlita is her with her mother today.   She will need a refill of her medications.   She is on the prozac for mood swings. Mother feels that the 20 mg is working well.   When Perlita was seen in January, she was having trouble with balance. She went to physical therapy and continues to work on daily exercises.   She has lost 14 pounds.   She is on contraception for management of menses  She also will need a refill of miralax for constipation.     History of Present Illness       She eats 2-3 servings of fruits and vegetables daily.She consumes 1 sweetened beverage(s) daily.She exercises with enough effort to increase her " "heart rate 9 or less minutes per day.  She exercises with enough effort to increase her heart rate 3 or less days per week.   She is taking medications regularly.             Review of Systems   Constitutional, HEENT, cardiovascular, pulmonary, GI, , musculoskeletal, neuro, skin, endocrine and psych systems are negative, except as otherwise noted.      Objective    /82   Pulse 96   Temp 98.8  F (37.1  C) (Tympanic)   Ht 1.626 m (5' 4\")   Wt 86.6 kg (191 lb)   SpO2 98%   BMI 32.79 kg/m    Body mass index is 32.79 kg/m .  Physical Exam   GENERAL: healthy, alert and no distress  RESP: lungs clear to auscultation - no rales, rhonchi or wheezes  CV: regular rate and rhythm, normal S1 S2, no S3 or S4, no murmur, click or rub, no peripheral edema and peripheral pulses strong  MS: no gross musculoskeletal defects noted, no edema  SKIN: no suspicious lesions or rashes  PSYCH: mentation appears normal, affect normal/bright, judgement and insight impaired and appearance well groomed                "

## 2021-11-30 ENCOUNTER — TRANSFERRED RECORDS (OUTPATIENT)
Dept: HEALTH INFORMATION MANAGEMENT | Facility: CLINIC | Age: 31
End: 2021-11-30
Payer: COMMERCIAL

## 2022-01-10 ENCOUNTER — DOCUMENTATION ONLY (OUTPATIENT)
Dept: OTHER | Facility: CLINIC | Age: 32
End: 2022-01-10
Payer: COMMERCIAL

## 2022-01-16 ENCOUNTER — HEALTH MAINTENANCE LETTER (OUTPATIENT)
Age: 32
End: 2022-01-16

## 2022-06-30 ENCOUNTER — OFFICE VISIT (OUTPATIENT)
Dept: OPHTHALMOLOGY | Facility: CLINIC | Age: 32
End: 2022-06-30
Payer: COMMERCIAL

## 2022-06-30 DIAGNOSIS — Q03.9 CONGENITAL HYDROCEPHALUS (H): ICD-10-CM

## 2022-06-30 DIAGNOSIS — H52.222 REGULAR ASTIGMATISM OF LEFT EYE: ICD-10-CM

## 2022-06-30 DIAGNOSIS — H52.13 MYOPIA OF BOTH EYES: ICD-10-CM

## 2022-06-30 DIAGNOSIS — H50.00 ESOTROPIA: Primary | ICD-10-CM

## 2022-06-30 PROCEDURE — 92015 DETERMINE REFRACTIVE STATE: CPT | Performed by: STUDENT IN AN ORGANIZED HEALTH CARE EDUCATION/TRAINING PROGRAM

## 2022-06-30 PROCEDURE — 92014 COMPRE OPH EXAM EST PT 1/>: CPT | Performed by: STUDENT IN AN ORGANIZED HEALTH CARE EDUCATION/TRAINING PROGRAM

## 2022-06-30 ASSESSMENT — REFRACTION_WEARINGRX
OS_AXIS: 108
OD_SPHERE: -9.25
OS_CYLINDER: +1.00
SPECS_TYPE: SVL
OD_CYLINDER: +1.25
OD_AXIS: 106
OS_SPHERE: -8.00

## 2022-06-30 ASSESSMENT — CONF VISUAL FIELD
OD_NORMAL: 1
OS_NORMAL: 1

## 2022-06-30 ASSESSMENT — REFRACTION_MANIFEST
OD_CYLINDER: SPHERE
OS_CYLINDER: +1.50
OS_SPHERE: -8.25
OD_SPHERE: -9.25
OS_AXIS: 130

## 2022-06-30 ASSESSMENT — VISUAL ACUITY
OS_CC: 3
OS_CC: 20/40
OD_CC: 5
CORRECTION_TYPE: GLASSES
METHOD: SNELLEN - LINEAR
OD_CC: 20/60

## 2022-06-30 ASSESSMENT — TONOMETRY
OS_IOP_MMHG: 15
IOP_METHOD: APPLANATION
OD_IOP_MMHG: 13

## 2022-06-30 ASSESSMENT — EXTERNAL EXAM - RIGHT EYE: OD_EXAM: NORMAL

## 2022-06-30 ASSESSMENT — CUP TO DISC RATIO
OD_RATIO: 0.6
OS_RATIO: 0.6

## 2022-06-30 ASSESSMENT — EXTERNAL EXAM - LEFT EYE: OS_EXAM: NORMAL

## 2022-06-30 ASSESSMENT — SLIT LAMP EXAM - LIDS
COMMENTS: NORMAL
COMMENTS: NORMAL

## 2022-06-30 NOTE — PATIENT INSTRUCTIONS
Continue artificial tears up to four times a day as needed    Glasses prescription given - optional to update    Litzy Lee MD  [phone]

## 2022-06-30 NOTE — LETTER
6/30/2022         RE: Perlita Molina  [address] [facility] [address]        Dear Colleague,    Thank you for referring your patient, Perlita Molina, to the [facility]. Please see a copy of my visit note below.     Current Eye Medications:  Artificial tears both eyes as needed.        Subjective:  Comprehensive Eye Exam.  No vision changes or concerns.    Glasses are working adequately.       Objective:  See Ophthalmology Exam.       Assessment:  Perlita Molina is a 32 year old female who presents with:   Encounter Diagnoses   Name Primary?     RET s/p repair x2 - Both Eyes Yes     Congenital hydrocephalus      Myopia of both eyes      Regular astigmatism of left eye      Stable eye exam.      Plan:  Continue artificial tears up to four times a day as needed    Glasses prescription given - optional to update    Litzy Lee MD  [phone]          Again, thank you for allowing me to participate in the care of your patient.        Sincerely,        Litzy Lee MD

## 2022-06-30 NOTE — PROGRESS NOTES
Current Eye Medications:  Artificial tears both eyes as needed.        Subjective:  Comprehensive Eye Exam.  No vision changes or concerns.    Glasses are working adequately.       Objective:  See Ophthalmology Exam.       Assessment:  Perlita Molina is a 32 year old female who presents with:   Encounter Diagnoses   Name Primary?     RET s/p repair x2 - Both Eyes Yes     Congenital hydrocephalus      Myopia of both eyes      Regular astigmatism of left eye      Stable eye exam.      Plan:  Continue artificial tears up to four times a day as needed    Glasses prescription given - optional to update    Litzy Lee MD  [phone]

## 2022-10-25 ENCOUNTER — OFFICE VISIT (OUTPATIENT)
Dept: FAMILY MEDICINE | Facility: CLINIC | Age: 32
End: 2022-10-25
Payer: COMMERCIAL

## 2022-10-25 VITALS
OXYGEN SATURATION: 93 % | BODY MASS INDEX: 33.97 KG/M2 | DIASTOLIC BLOOD PRESSURE: 86 MMHG | SYSTOLIC BLOOD PRESSURE: 130 MMHG | TEMPERATURE: 98.2 F | HEART RATE: 103 BPM | WEIGHT: 199 LBS | HEIGHT: 64 IN

## 2022-10-25 DIAGNOSIS — Z00.00 ROUTINE GENERAL MEDICAL EXAMINATION AT A HEALTH CARE FACILITY: Primary | ICD-10-CM

## 2022-10-25 DIAGNOSIS — Q03.9 CONGENITAL HYDROCEPHALUS (H): ICD-10-CM

## 2022-10-25 DIAGNOSIS — N92.0 EXCESSIVE OR FREQUENT MENSTRUATION: ICD-10-CM

## 2022-10-25 DIAGNOSIS — F81.9 COGNITIVE DEVELOPMENTAL DELAY: ICD-10-CM

## 2022-10-25 DIAGNOSIS — K59.01 SLOW TRANSIT CONSTIPATION: ICD-10-CM

## 2022-10-25 DIAGNOSIS — F39 EPISODIC MOOD DISORDER (H): ICD-10-CM

## 2022-10-25 DIAGNOSIS — Z23 HIGH PRIORITY FOR 2019-NCOV VACCINE: ICD-10-CM

## 2022-10-25 PROCEDURE — 91312 COVID-19,PF,PFIZER BOOSTER BIVALENT: CPT | Performed by: PHYSICIAN ASSISTANT

## 2022-10-25 PROCEDURE — G0145 SCR C/V CYTO,THINLAYER,RESCR: HCPCS | Performed by: PHYSICIAN ASSISTANT

## 2022-10-25 PROCEDURE — 90686 IIV4 VACC NO PRSV 0.5 ML IM: CPT | Performed by: PHYSICIAN ASSISTANT

## 2022-10-25 PROCEDURE — 90471 IMMUNIZATION ADMIN: CPT | Performed by: PHYSICIAN ASSISTANT

## 2022-10-25 PROCEDURE — 87624 HPV HI-RISK TYP POOLED RSLT: CPT | Performed by: PHYSICIAN ASSISTANT

## 2022-10-25 PROCEDURE — 99395 PREV VISIT EST AGE 18-39: CPT | Mod: 25 | Performed by: PHYSICIAN ASSISTANT

## 2022-10-25 PROCEDURE — 99213 OFFICE O/P EST LOW 20 MIN: CPT | Mod: 25 | Performed by: PHYSICIAN ASSISTANT

## 2022-10-25 PROCEDURE — 0124A COVID-19,PF,PFIZER BOOSTER BIVALENT: CPT | Performed by: PHYSICIAN ASSISTANT

## 2022-10-25 RX ORDER — LEVONORGESTREL / ETHINYL ESTRADIOL AND ETHINYL ESTRADIOL 150-30(84)
1 KIT ORAL DAILY
Qty: 84 TABLET | Refills: 4 | Status: SHIPPED | OUTPATIENT
Start: 2022-10-25 | End: 2023-11-16

## 2022-10-25 RX ORDER — POLYETHYLENE GLYCOL 3350 17 G/17G
POWDER, FOR SOLUTION ORAL
Qty: 510 G | Refills: 11 | Status: SHIPPED | OUTPATIENT
Start: 2022-10-25 | End: 2023-11-16

## 2022-10-25 ASSESSMENT — ENCOUNTER SYMPTOMS
CHILLS: 0
FEVER: 0
CONSTIPATION: 0
NERVOUS/ANXIOUS: 0
BREAST MASS: 0
DIZZINESS: 0
PALPITATIONS: 0
EYE PAIN: 0
HEMATOCHEZIA: 0
FREQUENCY: 0
ABDOMINAL PAIN: 0
MYALGIAS: 0
DIARRHEA: 0
SHORTNESS OF BREATH: 0
WEAKNESS: 0
DYSURIA: 0
NAUSEA: 0
HEARTBURN: 0
PARESTHESIAS: 0
ARTHRALGIAS: 0
COUGH: 0
HEADACHES: 0
JOINT SWELLING: 0
SORE THROAT: 0
HEMATURIA: 0

## 2022-10-25 ASSESSMENT — PAIN SCALES - GENERAL: PAINLEVEL: NO PAIN (0)

## 2022-10-25 NOTE — NURSING NOTE
".  Chief Complaint   Patient presents with     Physical       Initial /86   Pulse 103   Temp 98.2  F (36.8  C) (Tympanic)   Ht 1.626 m (5' 4\")   Wt 90.3 kg (199 lb)   LMP 09/19/2022 (Approximate)   SpO2 93%   BMI 34.16 kg/m   Estimated body mass index is 34.16 kg/m  as calculated from the following:    Height as of this encounter: 1.626 m (5' 4\").    Weight as of this encounter: 90.3 kg (199 lb).  Medication Reconciliation: complete    ELIAS Peacock MA    "

## 2022-10-25 NOTE — PROGRESS NOTES
SUBJECTIVE:   CC: Perlita is an 32 year old who presents with her mother today for preventive health visit and refills of her medications.       Patient has been advised of split billing requirements and indicates understanding: Yes  Healthy Habits:     Getting at least 3 servings of Calcium per day:  Yes    Bi-annual eye exam:  Yes    Dental care twice a year:  Yes    Sleep apnea or symptoms of sleep apnea:  None    Diet:  Regular (no restrictions)    Frequency of exercise:  None    Taking medications regularly:  Yes    Medication side effects:  None    PHQ-2 Total Score: 0    Additional concerns today:  No  Imm/Inj  Pertinent negatives include no abdominal pain, arthralgias, chest pain, chills, congestion, coughing, fever, headaches, joint swelling, myalgias, nausea, rash, sore throat or weakness.       PROBLEMS TO ADD:  1. Menorrhagia: managed with contraception  2. Mood disorder: managed with fluoxetine.   3. Constipation: managed with miralax.     Today's PHQ-2 Score:   PHQ-2 ( 1999 Pfizer) 10/25/2022   Q1: Little interest or pleasure in doing things 0   Q2: Feeling down, depressed or hopeless 0   PHQ-2 Score 0   PHQ-2 Total Score (12-17 Years)- Positive if 3 or more points; Administer PHQ-A if positive -   Q1: Little interest or pleasure in doing things Not at all   Q2: Feeling down, depressed or hopeless Not at all   PHQ-2 Score 0       Abuse: Current or Past (Physical, Sexual or Emotional) -   Do you feel safe in your environment?     She is living at home with her parents.         Social History     Tobacco Use     Smoking status: Never     Smokeless tobacco: Never   Substance Use Topics     Alcohol use: No     Alcohol/week: 0.0 standard drinks     If you drink alcohol do you typically have >3 drinks per day or >7 drinks per week? No    Alcohol Use 10/25/2022   Prescreen: >3 drinks/day or >7 drinks/week? Not Applicable   Prescreen: >3 drinks/day or >7 drinks/week? -   No flowsheet data found.    Reviewed  orders with patient.  Reviewed health maintenance and updated orders accordingly - Yes  BP Readings from Last 3 Encounters:   10/25/22 130/86   10/07/21 119/82   01/05/21 122/72    Wt Readings from Last 3 Encounters:   10/25/22 90.3 kg (199 lb)   10/07/21 86.6 kg (191 lb)   10/05/20 93 kg (205 lb)                  Patient Active Problem List   Diagnosis     RET s/p repair x2     Congenital hydrocephalus (H)     Developmental delay     PCOS (polycystic ovarian syndrome)     Nonfunctioning ventriculoperitoneal shunt (H)     Episodic mood disorder (H)     Hearing loss     Constipation     CARDIOVASCULAR SCREENING; LDL GOAL LESS THAN 160     Excessive or frequent menstruation     Anxiety     Congenital jaw deformity     ACP (advance care planning)     At high risk for falls     Past Surgical History:   Procedure Laterality Date     ABDOMEN SURGERY  October 2019    For shunt tube     BIOPSY  August 2017    Awaiting results     EYE SURGERY  age 4 or 5    strabismus      IMPLANT SHUNT VENTRICULOPERITONEAL  1 week of age    for hydrocephalus; had last revision at age 3     STRABISMUS SURGERY       SURGICAL HISTORY OF -   by age 5    strabismus surgery x2       Social History     Tobacco Use     Smoking status: Never     Smokeless tobacco: Never   Substance Use Topics     Alcohol use: No     Alcohol/week: 0.0 standard drinks     Family History   Problem Relation Age of Onset     Cancer Paternal Grandmother      Heart Disease Paternal Grandfather      Diabetes Mother      Hypertension Mother      Obesity Mother      Hypertension Father      Obesity Father          Current Outpatient Medications   Medication Sig Dispense Refill     FLUoxetine (PROZAC) 20 MG capsule Take 1 capsule (20 mg) by mouth daily 30 capsule 11     levonorgest-eth estrad 91-Day (ASHLYNA) 0.15-0.03 &0.01 MG tablet Take 1 tablet by mouth daily Medication taken continuously, menstrual cycle every 3 months 84 tablet 4     Multiple Vitamin (DAILY MULTIVITAMIN  PO) Take  by mouth.       polyethylene glycol (MIRALAX) 17 GM/Dose powder USE 1/2 TO 1 CAPFUL IN 8 OZ OF BEVERAGE AND DRINK ONCE DAILY 510 g 11     No Known Allergies  Recent Labs   Lab Test 10/10/18  0758   LDL 86   HDL 57   TRIG 112   CR 0.62   GFRESTIMATED >90   GFRESTBLACK >90   POTASSIUM 4.2   TSH 1.73        Breast Cancer Screening:    FHS-7:   Breast CA Risk Assessment (FHS-7) 10/25/2022   Did any of your first-degree relatives have breast or ovarian cancer? Unknown   Did any of your relatives have bilateral breast cancer? Unknown   Did any man in your family have breast cancer? No   Did any woman in your family have breast and ovarian cancer? No   Did any woman in your family have breast cancer before age 50 y? No   Do you have 2 or more relatives with breast and/or ovarian cancer? No   Do you have 2 or more relatives with breast and/or bowel cancer? No       Patient under 40 years of age: Routine Mammogram Screening not recommended.   Pertinent mammograms are reviewed under the imaging tab.    History of abnormal Pap smear:   NO - age 30-65 PAP every 5 years with negative HPV co-testing recommended  Last 3 Pap and HPV Results:   PAP / HPV 10/7/2019 12/28/2015 12/28/2012   PAP (Historical) NIL NIL NIL     PAP / HPV 10/7/2019 12/28/2015 12/28/2012   PAP (Historical) NIL NIL NIL     Reviewed and updated as needed this visit by clinical staff   Tobacco  Allergies  Meds   Med Hx  Surg Hx  Fam Hx  Soc Hx        Reviewed and updated as needed this visit by Provider                 Past Medical History:   Diagnosis Date     Congenital hydrocephalus (H)      Development delay      Esotropia     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     Hearing loss     right ear     Mood disorder (H)     seen at [facility]      Past Surgical History:   Procedure Laterality Date     ABDOMEN SURGERY  October 2019    For shunt tube     BIOPSY  August 2017    Awaiting results     EYE SURGERY  age 4 or 5    strabismus       "IMPLANT SHUNT VENTRICULOPERITONEAL  1 week of age    for hydrocephalus; had last revision at age 3     STRABISMUS SURGERY       SURGICAL HISTORY OF -   by age 5    strabismus surgery x2     OB History    Para Term  AB Living   0 0 0 0 0 0   SAB IAB Ectopic Multiple Live Births   0 0 0 0 0       Review of Systems   Constitutional: Negative for chills and fever.   HENT: Negative for congestion, ear pain, hearing loss and sore throat.    Eyes: Negative for pain and visual disturbance.   Respiratory: Negative for cough and shortness of breath.    Cardiovascular: Negative for chest pain, palpitations and peripheral edema.   Gastrointestinal: Negative for abdominal pain, constipation, diarrhea, heartburn, hematochezia and nausea.   Breasts:  Negative for tenderness, breast mass and discharge.   Genitourinary: Negative for dysuria, frequency, genital sores, hematuria, pelvic pain, urgency, vaginal bleeding and vaginal discharge.   Musculoskeletal: Negative for arthralgias, joint swelling and myalgias.   Skin: Negative for rash.   Neurological: Negative for dizziness, weakness, headaches and paresthesias.   Psychiatric/Behavioral: Negative for mood changes. The patient is not nervous/anxious.           OBJECTIVE:   /86   Pulse 103   Temp 98.2  F (36.8  C) (Tympanic)   Ht 1.626 m (5' 4\")   Wt 90.3 kg (199 lb)   LMP 2022 (Approximate)   SpO2 93%   BMI 34.16 kg/m    Physical Exam  GENERAL: healthy, alert and no distress  EYES: Eyes grossly normal to inspection, PERRL and conjunctivae and sclerae normal  HENT: ear canals and TM's normal, nose and mouth without ulcers or lesions  NECK: no adenopathy, no asymmetry, masses, or scars and thyroid normal to palpation  RESP: lungs clear to auscultation - no rales, rhonchi or wheezes  BREAST: normal without masses, tenderness or nipple discharge and no palpable axillary masses or adenopathy  CV: regular rate and rhythm, normal S1 S2, no S3 or S4, no " "murmur, click or rub, no peripheral edema and peripheral pulses strong  ABDOMEN: soft, nontender, no hepatosplenomegaly, no masses and bowel sounds normal   (female): normal female external genitalia, normal urethral meatus, vaginal mucosa pink, moist, well rugated, and normal cervix/adnexa/uterus without masses or discharge  MS: no gross musculoskeletal defects noted, no edema  SKIN: no suspicious lesions or rashes  NEURO: Normal strength and tone, mentation intact and speech normal  PSYCH: mentation appears normal, affect normal/bright    Diagnostic Test Results:  Labs reviewed in Epic    ASSESSMENT/PLAN:   (Z00.00) Routine general medical examination at a health care facility  (primary encounter diagnosis)  Comment: Health maintenance reviewed and updated.  Plan: PAP screen with HPV - recommended age 30 - 65         years            (N92.0) Excessive or frequent menstruation  Comment: refill given   Contraception working well.   Plan: levonorgest-eth estrad 91-Day (ASHLYNA)         0.15-0.03 &0.01 MG tablet            (F39) Episodic mood disorder (H)  Comment: stable,   She continues to live at home and doing well.  Plan: FLUoxetine (PROZAC) 20 MG capsule            (K59.01) Slow transit constipation  Comment: stable,  Plan: polyethylene glycol (MIRALAX) 17 GM/Dose powder            (Z23) High priority for 2019-nCoV vaccine      (Q03.9) Congenital hydrocephalus (H)      (F81.9) Cognitive developmental delay      Patient has been advised of split billing requirements and indicates understanding: Yes      COUNSELING:  Reviewed preventive health counseling, as reflected in patient instructions       Regular exercise       Healthy diet/nutrition    Estimated body mass index is 34.16 kg/m  as calculated from the following:    Height as of this encounter: 1.626 m (5' 4\").    Weight as of this encounter: 90.3 kg (199 lb).    Weight management plan: Discussed healthy diet and exercise guidelines    She reports that she " has never smoked. She has never used smokeless tobacco.      Counseling Resources:  ATP IV Guidelines  Pooled Cohorts Equation Calculator  Breast Cancer Risk Calculator  BRCA-Related Cancer Risk Assessment: FHS-7 Tool  FRAX Risk Assessment  ICSI Preventive Guidelines  Dietary Guidelines for Americans, 2010  USDA's MyPlate  ASA Prophylaxis  Lung CA Screening    Kristen M. Kehr, PA-C [facility]

## 2022-10-27 LAB
BKR LAB AP GYN ADEQUACY: NORMAL
BKR LAB AP GYN INTERPRETATION: NORMAL
BKR LAB AP HPV REFLEX: NORMAL
BKR LAB AP PREVIOUS ABNORMAL: NORMAL
PATH REPORT.COMMENTS IMP SPEC: NORMAL
PATH REPORT.COMMENTS IMP SPEC: NORMAL
PATH REPORT.RELEVANT HX SPEC: NORMAL

## 2022-10-31 LAB
HUMAN PAPILLOMA VIRUS 16 DNA: NEGATIVE
HUMAN PAPILLOMA VIRUS 18 DNA: NEGATIVE
HUMAN PAPILLOMA VIRUS FINAL DIAGNOSIS: NORMAL
HUMAN PAPILLOMA VIRUS OTHER HR: NEGATIVE

## 2022-11-30 ENCOUNTER — TRANSFERRED RECORDS (OUTPATIENT)
Dept: HEALTH INFORMATION MANAGEMENT | Facility: CLINIC | Age: 32
End: 2022-11-30

## 2023-08-10 ENCOUNTER — OFFICE VISIT (OUTPATIENT)
Dept: OPHTHALMOLOGY | Facility: CLINIC | Age: 33
End: 2023-08-10
Payer: COMMERCIAL

## 2023-08-10 DIAGNOSIS — H50.00 ESOTROPIA: Primary | ICD-10-CM

## 2023-08-10 DIAGNOSIS — Q03.9 CONGENITAL HYDROCEPHALUS (H): ICD-10-CM

## 2023-08-10 DIAGNOSIS — H52.13 MYOPIA OF BOTH EYES: ICD-10-CM

## 2023-08-10 DIAGNOSIS — H52.222 REGULAR ASTIGMATISM OF LEFT EYE: ICD-10-CM

## 2023-08-10 PROCEDURE — 92014 COMPRE OPH EXAM EST PT 1/>: CPT | Performed by: STUDENT IN AN ORGANIZED HEALTH CARE EDUCATION/TRAINING PROGRAM

## 2023-08-10 PROCEDURE — 92015 DETERMINE REFRACTIVE STATE: CPT | Performed by: STUDENT IN AN ORGANIZED HEALTH CARE EDUCATION/TRAINING PROGRAM

## 2023-08-10 ASSESSMENT — CONF VISUAL FIELD
OS_SUPERIOR_NASAL_RESTRICTION: 0
OD_NORMAL: 1
OS_SUPERIOR_TEMPORAL_RESTRICTION: 0
OD_INFERIOR_NASAL_RESTRICTION: 0
OD_INFERIOR_TEMPORAL_RESTRICTION: 0
OD_SUPERIOR_NASAL_RESTRICTION: 0
OS_INFERIOR_NASAL_RESTRICTION: 0
METHOD: COUNTING FINGERS
OD_SUPERIOR_TEMPORAL_RESTRICTION: 0
OS_NORMAL: 1
OS_INFERIOR_TEMPORAL_RESTRICTION: 0

## 2023-08-10 ASSESSMENT — REFRACTION_WEARINGRX
OS_CYLINDER: +1.00
OS_AXIS: 108
OS_SPHERE: -8.00
OD_CYLINDER: +1.25
SPECS_TYPE: SVL
OD_AXIS: 106
OD_SPHERE: -9.25

## 2023-08-10 ASSESSMENT — VISUAL ACUITY
OD_PH_CC: 20/40
OD_CC: J1+
OS_CC: J1+
METHOD: SNELLEN - LINEAR
OD_CC: 20/70
OS_CC: 20/60
CORRECTION_TYPE: GLASSES
OS_PH_CC: 20/40

## 2023-08-10 ASSESSMENT — CUP TO DISC RATIO
OS_RATIO: 0.6
OD_RATIO: 0.6

## 2023-08-10 ASSESSMENT — EXTERNAL EXAM - LEFT EYE: OS_EXAM: NORMAL

## 2023-08-10 ASSESSMENT — EXTERNAL EXAM - RIGHT EYE: OD_EXAM: NORMAL

## 2023-08-10 ASSESSMENT — TONOMETRY
OD_IOP_MMHG: 17
IOP_METHOD: APPLANATION
OS_IOP_MMHG: 15

## 2023-08-10 ASSESSMENT — REFRACTION_MANIFEST
OD_SPHERE: -9.00
OS_AXIS: 110
OS_CYLINDER: +0.75
OD_CYLINDER: +1.00
OD_AXIS: 121
OS_SPHERE: -7.75

## 2023-08-10 ASSESSMENT — SLIT LAMP EXAM - LIDS
COMMENTS: NORMAL
COMMENTS: NORMAL

## 2023-08-10 NOTE — LETTER
8/10/2023         RE: Perlita Molina  [address] [place] [address]        Dear Colleague,    Thank you for referring your patient, Perlita Molina, to the [facility]. Please see a copy of my visit note below.     Current Eye Medications:  none     Subjective:  Patient presents eye exam today. She is accompanied with her father. No vision concerns, pt wears glasses full time with no issues.      Objective:  See Ophthalmology Exam.       Assessment:  Perlita Molina is a 33 year old female who presents with:   Encounter Diagnoses   Name Primary?     Esotropia - RET s/p repair x 2 Yes     Congenital hydrocephalus (H)      Myopia of both eyes      Regular astigmatism of left eye      Stable eye exam.     Plan:  Glasses prescription given - optional to update    Litzy Lee MD  [phone]      Again, thank you for allowing me to participate in the care of your patient.        Sincerely,        Litzy Lee MD

## 2023-08-10 NOTE — PROGRESS NOTES
Current Eye Medications:  none     Subjective:  Patient presents eye exam today. She is accompanied with her father. No vision concerns, pt wears glasses full time with no issues.      Objective:  See Ophthalmology Exam.       Assessment:  Perlita Molina is a 33 year old female who presents with:   Encounter Diagnoses   Name Primary?    Esotropia - RET s/p repair x 2 Yes    Congenital hydrocephalus (H)     Myopia of both eyes     Regular astigmatism of left eye      Stable eye exam.     Plan:  Glasses prescription given - optional to update    Litzy Lee MD  [phone]

## 2023-10-19 DIAGNOSIS — F39 EPISODIC MOOD DISORDER (H): ICD-10-CM

## 2023-11-16 ENCOUNTER — OFFICE VISIT (OUTPATIENT)
Dept: FAMILY MEDICINE | Facility: CLINIC | Age: 33
End: 2023-11-16
Payer: COMMERCIAL

## 2023-11-16 VITALS
OXYGEN SATURATION: 95 % | HEIGHT: 64 IN | RESPIRATION RATE: 16 BRPM | WEIGHT: 195 LBS | BODY MASS INDEX: 33.29 KG/M2 | TEMPERATURE: 97.6 F | HEART RATE: 110 BPM | SYSTOLIC BLOOD PRESSURE: 130 MMHG | DIASTOLIC BLOOD PRESSURE: 87 MMHG

## 2023-11-16 DIAGNOSIS — Z00.00 ROUTINE GENERAL MEDICAL EXAMINATION AT A HEALTH CARE FACILITY: Primary | ICD-10-CM

## 2023-11-16 DIAGNOSIS — F39 EPISODIC MOOD DISORDER (H): ICD-10-CM

## 2023-11-16 DIAGNOSIS — N92.0 EXCESSIVE OR FREQUENT MENSTRUATION: ICD-10-CM

## 2023-11-16 DIAGNOSIS — K59.01 SLOW TRANSIT CONSTIPATION: ICD-10-CM

## 2023-11-16 PROCEDURE — 99213 OFFICE O/P EST LOW 20 MIN: CPT | Mod: 25 | Performed by: PHYSICIAN ASSISTANT

## 2023-11-16 PROCEDURE — 99395 PREV VISIT EST AGE 18-39: CPT | Performed by: PHYSICIAN ASSISTANT

## 2023-11-16 RX ORDER — LEVONORGESTREL / ETHINYL ESTRADIOL AND ETHINYL ESTRADIOL 150-30(84)
1 KIT ORAL DAILY
Qty: 84 TABLET | Refills: 4 | Status: SHIPPED | OUTPATIENT
Start: 2023-11-16 | End: 2023-11-27

## 2023-11-16 RX ORDER — POLYETHYLENE GLYCOL 3350 17 G/17G
POWDER, FOR SOLUTION ORAL
Qty: 510 G | Refills: 11 | Status: SHIPPED | OUTPATIENT
Start: 2023-11-16

## 2023-11-16 RX ORDER — ETHINYL ESTRADIOL AND LEVONORGESTREL 150-30(84)
KIT ORAL
Qty: 91 TABLET | OUTPATIENT
Start: 2023-11-16

## 2023-11-16 ASSESSMENT — ENCOUNTER SYMPTOMS
MYALGIAS: 0
SHORTNESS OF BREATH: 0
FEVER: 0
DIZZINESS: 0
FREQUENCY: 0
CONSTIPATION: 0
JOINT SWELLING: 0
PALPITATIONS: 0
COUGH: 0
NAUSEA: 0
BREAST MASS: 0
WEAKNESS: 0
ARTHRALGIAS: 0
HEADACHES: 0
PARESTHESIAS: 0
HEMATOCHEZIA: 0
HEARTBURN: 0
NERVOUS/ANXIOUS: 0
CHILLS: 0
DYSURIA: 0
HEMATURIA: 0
SORE THROAT: 0
ABDOMINAL PAIN: 0
DIARRHEA: 0
EYE PAIN: 0

## 2023-11-16 ASSESSMENT — PAIN SCALES - GENERAL: PAINLEVEL: NO PAIN (0)

## 2023-11-16 NOTE — PROGRESS NOTES
SUBJECTIVE:   Perlita is a 33 year old, presenting for the following:  Physical        11/16/2023    11:46 AM   Additional Questions   Roomed by Leroy PEREIRA MA       Healthy Habits:     Getting at least 3 servings of Calcium per day:  Yes    Bi-annual eye exam:  Yes    Dental care twice a year:  Yes    Sleep apnea or symptoms of sleep apnea:  None    Diet:  Regular (no restrictions)    Frequency of exercise:  6-7 days/week    Duration of exercise:  Less than 15 minutes    Taking medications regularly:  Yes    Medication side effects:  Not applicable    Additional concerns today:  No      Today's PHQ-2 Score:       11/16/2023    11:35 AM   PHQ-2 ( 1999 Pfizer)   Q1: Little interest or pleasure in doing things 0   Q2: Feeling down, depressed or hopeless 0   PHQ-2 Score 0   Q1: Little interest or pleasure in doing things Not at all   Q2: Feeling down, depressed or hopeless Not at all   PHQ-2 Score 0             Social History     Tobacco Use    Smoking status: Never    Smokeless tobacco: Never   Substance Use Topics    Alcohol use: No     Alcohol/week: 0.0 standard drinks of alcohol             11/16/2023    11:35 AM   Alcohol Use   Prescreen: >3 drinks/day or >7 drinks/week? Not Applicable          No data to display              Reviewed orders with patient.  Reviewed health maintenance and updated orders accordingly - Yes  BP Readings from Last 3 Encounters:   11/16/23 130/87   10/25/22 130/86   10/07/21 119/82    Wt Readings from Last 3 Encounters:   11/16/23 88.5 kg (195 lb)   10/25/22 90.3 kg (199 lb)   10/07/21 86.6 kg (191 lb)                  Patient Active Problem List   Diagnosis    RET s/p repair x2    Congenital hydrocephalus (H)    Cognitive developmental delay    PCOS (polycystic ovarian syndrome)    Nonfunctioning ventriculoperitoneal shunt (H24)    Episodic mood disorder (H24)    Hearing loss    Constipation    CARDIOVASCULAR SCREENING; LDL GOAL LESS THAN 160    Excessive or frequent menstruation     Anxiety    Congenital jaw deformity    ACP (advance care planning)    At high risk for falls     Past Surgical History:   Procedure Laterality Date    ABDOMEN SURGERY  October 2019    For shunt tube    BIOPSY  August 2017    Awaiting results    EYE SURGERY  age 4 or 5    strabismus     IMPLANT SHUNT VENTRICULOPERITONEAL  1 week of age    for hydrocephalus; had last revision at age 3    STRABISMUS SURGERY      SURGICAL HISTORY OF -   by age 5    strabismus surgery x2       Social History     Tobacco Use    Smoking status: Never    Smokeless tobacco: Never   Substance Use Topics    Alcohol use: No     Alcohol/week: 0.0 standard drinks of alcohol     Family History   Problem Relation Age of Onset    Diabetes Mother     Hypertension Mother     Obesity Mother     Glaucoma Father     Hypertension Father     Obesity Father     Cancer Paternal Grandmother     Heart Disease Paternal Grandfather     Macular Degeneration No family hx of          Current Outpatient Medications   Medication Sig Dispense Refill    FLUoxetine (PROZAC) 20 MG capsule Take 1 capsule (20 mg) by mouth daily 30 capsule 0    levonorgest-eth estrad 91-Day (ASHLYNA) 0.15-0.03 &0.01 MG tablet Take 1 tablet by mouth daily Medication taken continuously, menstrual cycle every 3 months 84 tablet 4    Multiple Vitamin (DAILY MULTIVITAMIN PO) Take  by mouth.      polyethylene glycol (MIRALAX) 17 GM/Dose powder USE 1/2 TO 1 CAPFUL IN 8 OZ OF BEVERAGE AND DRINK ONCE DAILY 510 g 11     No Known Allergies  Recent Labs   Lab Test 10/10/18  0758   LDL 86   HDL 57   TRIG 112   CR 0.62   GFRESTIMATED >90   GFRESTBLACK >90   POTASSIUM 4.2   TSH 1.73        Breast Cancer Screening:        10/25/2022    11:08 AM 11/16/2023    11:36 AM   Breast CA Risk Assessment (FHS-7)   Do you have a family history of breast, colon, or ovarian cancer? Yes No / Unknown         Patient under 40 years of age: Routine Mammogram Screening not recommended.   Pertinent mammograms are reviewed  under the imaging tab.    History of abnormal Pap smear: NO - age 30- 65 PAP every 3 years recommended  Last 3 Pap and HPV Results:       Latest Ref Rng & Units 10/25/2022    11:40 AM 10/7/2019     5:32 PM 12/28/2015    12:00 AM   PAP / HPV   PAP  Negative for Intraepithelial Lesion or Malignancy (NILM)      PAP (Historical)   NIL  NIL    HPV 16 DNA Negative Negative      HPV 18 DNA Negative Negative      Other HR HPV Negative Negative            Latest Ref Rng & Units 10/25/2022    11:40 AM 10/7/2019     5:32 PM 12/28/2015    12:00 AM   PAP / HPV   PAP  Negative for Intraepithelial Lesion or Malignancy (NILM)      PAP (Historical)   NIL  NIL    HPV 16 DNA Negative Negative      HPV 18 DNA Negative Negative      Other HR HPV Negative Negative        Reviewed and updated as needed this visit by clinical staff   Tobacco  Allergies  Meds              Reviewed and updated as needed this visit by Provider                 Past Medical History:   Diagnosis Date    Congenital hydrocephalus (H)     Development delay     Esotropia     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   HENT:  Negative for congestion, ear pain, hearing loss and sore throat.    Eyes:  Negative for pain and visual disturbance.   Respiratory:  Negative for cough and shortness of breath.    Cardiovascular:  Negative for chest pain, palpitations and peripheral edema.   Gastrointestinal:  Negative for abdominal pain, constipation,  "diarrhea, heartburn, hematochezia and nausea.   Breasts:  Negative for tenderness, breast mass and discharge.   Genitourinary:  Negative for dysuria, frequency, genital sores, hematuria, pelvic pain, urgency, vaginal bleeding and vaginal discharge.   Musculoskeletal:  Negative for arthralgias, joint swelling and myalgias.   Skin:  Negative for rash.   Neurological:  Negative for dizziness, weakness, headaches and paresthesias.   Psychiatric/Behavioral:  Negative for mood changes. The patient is not nervous/anxious.           OBJECTIVE:   /87   Pulse 110   Temp 97.6  F (36.4  C) (Tympanic)   Resp 16   Ht 1.626 m (5' 4\")   Wt 88.5 kg (195 lb)   LMP  (LMP Unknown)   SpO2 95%   Breastfeeding No   BMI 33.47 kg/m    Physical Exam  GENERAL: healthy, alert and no distress  EYES: Eyes grossly normal to inspection, PERRL and conjunctivae and sclerae normal  HENT: ear canals and TM's normal, nose and mouth without ulcers or lesions  NECK: no adenopathy, no asymmetry, masses, or scars and thyroid normal to palpation  RESP: lungs clear to auscultation - no rales, rhonchi or wheezes  BREAST: normal without masses, tenderness or nipple discharge and no palpable axillary masses or adenopathy  CV: regular rate and rhythm, normal S1 S2, no S3 or S4, no murmur, click or rub, no peripheral edema and peripheral pulses strong  ABDOMEN: soft, nontender, no hepatosplenomegaly, no masses and bowel sounds normal  MS: no gross musculoskeletal defects noted, no edema  SKIN: no suspicious lesions or rashes  NEURO: Normal strength and tone, mentation intact and speech normal  PSYCH: mentation appears normal, affect normal/bright    Diagnostic Test Results:  Labs reviewed in Epic    ASSESSMENT/PLAN:   (Z00.00) Routine general medical examination at a health care facility  (primary encounter diagnosis)  Comment: Health maintenance reviewed and updated.  Plan: Lipid panel reflex to direct LDL Fasting,         Hemoglobin A1c        " "    (F39) Episodic mood disorder (H24)  Comment: stable, refills given  Plan: FLUoxetine (PROZAC) 20 MG capsule            (N92.0) Excessive or frequent menstruation  Comment: doing well. Refills given   Plan: levonorgest-eth estrad 91-Day (ASHLYNA)         0.15-0.03 &0.01 MG tablet            (K59.01) Slow transit constipation  Plan: polyethylene glycol (MIRALAX) 17 GM/Dose powder                  COUNSELING:  Reviewed preventive health counseling, as reflected in patient instructions       Regular exercise       Healthy diet/nutrition      BMI:   Estimated body mass index is 33.47 kg/m  as calculated from the following:    Height as of this encounter: 1.626 m (5' 4\").    Weight as of this encounter: 88.5 kg (195 lb).         She reports that she has never smoked. She has never used smokeless tobacco.          Kristen M. Kehr, PA-C  [facility]  "

## 2023-11-27 DIAGNOSIS — N92.0 EXCESSIVE OR FREQUENT MENSTRUATION: ICD-10-CM

## 2023-11-27 RX ORDER — LEVONORGESTREL / ETHINYL ESTRADIOL AND ETHINYL ESTRADIOL 150-30(84)
1 KIT ORAL DAILY
Qty: 84 TABLET | Refills: 4 | Status: SHIPPED | OUTPATIENT
Start: 2023-11-27

## 2023-11-27 NOTE — TELEPHONE ENCOUNTER
Requesting refill on Rx levonorgest-eth estrad 91-Day (ASHLYNA) 0.15-0.03 &0.01 MG tablet. Per patients mother the pharmacy states they did not receive this refill.     Tania Lakhani,    [facility]

## 2023-12-05 ENCOUNTER — LAB (OUTPATIENT)
Dept: LAB | Facility: CLINIC | Age: 33
End: 2023-12-05
Payer: COMMERCIAL

## 2023-12-05 DIAGNOSIS — Z00.00 ROUTINE GENERAL MEDICAL EXAMINATION AT A HEALTH CARE FACILITY: ICD-10-CM

## 2023-12-05 LAB
CHOLEST SERPL-MCNC: 163 MG/DL
HBA1C MFR BLD: 5.3 % (ref 0–5.6)
HDLC SERPL-MCNC: 48 MG/DL
LDLC SERPL CALC-MCNC: 92 MG/DL
NONHDLC SERPL-MCNC: 115 MG/DL
TRIGL SERPL-MCNC: 116 MG/DL

## 2023-12-05 PROCEDURE — 36415 COLL VENOUS BLD VENIPUNCTURE: CPT

## 2023-12-05 PROCEDURE — 83036 HEMOGLOBIN GLYCOSYLATED A1C: CPT

## 2023-12-05 PROCEDURE — 80061 LIPID PANEL: CPT

## 2023-12-17 DIAGNOSIS — F39 EPISODIC MOOD DISORDER (H): ICD-10-CM

## 2024-02-22 ENCOUNTER — DOCUMENTATION ONLY (OUTPATIENT)
Dept: OTHER | Facility: CLINIC | Age: 34
End: 2024-02-22
Payer: COMMERCIAL

## 2024-09-05 ENCOUNTER — OFFICE VISIT (OUTPATIENT)
Dept: OPHTHALMOLOGY | Facility: CLINIC | Age: 34
End: 2024-09-05
Payer: COMMERCIAL

## 2024-09-05 DIAGNOSIS — Q03.9 CONGENITAL HYDROCEPHALUS (H): ICD-10-CM

## 2024-09-05 DIAGNOSIS — H50.00 ESOTROPIA: Primary | ICD-10-CM

## 2024-09-05 DIAGNOSIS — H52.13 MYOPIA OF BOTH EYES: ICD-10-CM

## 2024-09-05 DIAGNOSIS — H52.222 REGULAR ASTIGMATISM OF LEFT EYE: ICD-10-CM

## 2024-09-05 PROCEDURE — 92015 DETERMINE REFRACTIVE STATE: CPT | Performed by: STUDENT IN AN ORGANIZED HEALTH CARE EDUCATION/TRAINING PROGRAM

## 2024-09-05 PROCEDURE — 92014 COMPRE OPH EXAM EST PT 1/>: CPT | Performed by: STUDENT IN AN ORGANIZED HEALTH CARE EDUCATION/TRAINING PROGRAM

## 2024-09-05 ASSESSMENT — TONOMETRY
OS_IOP_MMHG: 13
IOP_METHOD: APPLANATION
OD_IOP_MMHG: 12

## 2024-09-05 ASSESSMENT — VISUAL ACUITY
OD_PH_CC: 20/50
OS_PH_CC: 20/30
OS_CC: 20/40
METHOD: SNELLEN - LINEAR
OD_CC: 20/70
OD_PH_CC+: -1
CORRECTION_TYPE: GLASSES

## 2024-09-05 ASSESSMENT — CUP TO DISC RATIO
OS_RATIO: 0.6
OD_RATIO: 0.6

## 2024-09-05 ASSESSMENT — REFRACTION_WEARINGRX
OS_AXIS: 105
OD_CYLINDER: +1.25
OS_CYLINDER: +1.00
OD_SPHERE: -9.25
OD_AXIS: 107
SPECS_TYPE: SVL
OS_SPHERE: -8.00

## 2024-09-05 ASSESSMENT — CONF VISUAL FIELD
METHOD: COUNTING FINGERS
OD_SUPERIOR_TEMPORAL_RESTRICTION: 0
OD_SUPERIOR_NASAL_RESTRICTION: 0
OD_NORMAL: 1
OS_INFERIOR_NASAL_RESTRICTION: 0
OS_SUPERIOR_TEMPORAL_RESTRICTION: 0
OD_INFERIOR_NASAL_RESTRICTION: 0
OS_INFERIOR_TEMPORAL_RESTRICTION: 0
OS_SUPERIOR_NASAL_RESTRICTION: 0
OD_INFERIOR_TEMPORAL_RESTRICTION: 0
OS_NORMAL: 1

## 2024-09-05 ASSESSMENT — EXTERNAL EXAM - RIGHT EYE: OD_EXAM: NORMAL

## 2024-09-05 ASSESSMENT — REFRACTION_MANIFEST
OS_SPHERE: -8.00
OD_AXIS: 118
OS_CYLINDER: +1.00
OD_SPHERE: -10.25
OS_AXIS: 123
OD_CYLINDER: +1.00

## 2024-09-05 ASSESSMENT — SLIT LAMP EXAM - LIDS
COMMENTS: NORMAL
COMMENTS: NORMAL

## 2024-09-05 ASSESSMENT — EXTERNAL EXAM - LEFT EYE: OS_EXAM: NORMAL

## 2024-09-05 NOTE — LETTER
"9/5/2024      Perlita Molina  [address] [place] [facility] [address]      Dear Colleague,    Thank you for referring your patient, Perlita Molina, to the [facility]. Please see a copy of my visit note below.     Current Eye Medications:  visine - both eyes PRN      Subjective:  comprehensive eye exam - glasses working well. No pain or discomfort either eye. Has trouble finding glasses to stay on patient's face.    Present with dad today.     Objective:  See Ophthalmology Exam.       Assessment:  Perlita Molina is a 34 year old female who presents with:   Encounter Diagnoses   Name Primary?     Esotropia - RET s/p repair x 2 Yes     Congenital hydrocephalus (H)      Myopia of both eyes      Regular astigmatism of left eye      Stable eye exam.      Plan:  Continue artificial tears up to four times a day as needed (Refresh Optive, Systane Balance, or TheraTears. Avoid generic artificial tears or \"get the red out\" drops).     Glasses prescription given - optional to update    Litzy Lee MD  [phone]      Again, thank you for allowing me to participate in the care of your patient.        Sincerely,        Litzy Lee MD  "

## 2024-09-05 NOTE — PROGRESS NOTES
" Current Eye Medications:  visine - both eyes PRN      Subjective:  comprehensive eye exam - glasses working well. No pain or discomfort either eye. Has trouble finding glasses to stay on patient's face.    Present with dad today.     Objective:  See Ophthalmology Exam.       Assessment:  Perlita Molina is a 34 year old female who presents with:   Encounter Diagnoses   Name Primary?    Esotropia - RET s/p repair x 2 Yes    Congenital hydrocephalus (H)     Myopia of both eyes     Regular astigmatism of left eye      Stable eye exam.      Plan:  Continue artificial tears up to four times a day as needed (Refresh Optive, Systane Balance, or TheraTears. Avoid generic artificial tears or \"get the red out\" drops).     Glasses prescription given - optional to update    Litzy Lee MD  [phone]      "

## 2024-09-05 NOTE — PATIENT INSTRUCTIONS
"Continue artificial tears up to four times a day as needed (Refresh Optive, Systane Balance, or TheraTears. Avoid generic artificial tears or \"get the red out\" drops).     Glasses prescription given - optional to update    Litzy Lee MD  [phone]    "

## 2024-09-14 DIAGNOSIS — F39 EPISODIC MOOD DISORDER (H): ICD-10-CM

## 2024-11-14 SDOH — HEALTH STABILITY: PHYSICAL HEALTH: ON AVERAGE, HOW MANY DAYS PER WEEK DO YOU ENGAGE IN MODERATE TO STRENUOUS EXERCISE (LIKE A BRISK WALK)?: 0 DAYS

## 2024-11-14 SDOH — HEALTH STABILITY: PHYSICAL HEALTH: ON AVERAGE, HOW MANY MINUTES DO YOU ENGAGE IN EXERCISE AT THIS LEVEL?: 0 MIN

## 2024-11-14 ASSESSMENT — SOCIAL DETERMINANTS OF HEALTH (SDOH): HOW OFTEN DO YOU GET TOGETHER WITH FRIENDS OR RELATIVES?: ONCE A WEEK

## 2024-11-18 ENCOUNTER — OFFICE VISIT (OUTPATIENT)
Dept: FAMILY MEDICINE | Facility: CLINIC | Age: 34
End: 2024-11-18
Attending: PHYSICIAN ASSISTANT
Payer: COMMERCIAL

## 2024-11-18 VITALS
BODY MASS INDEX: 34.66 KG/M2 | WEIGHT: 203 LBS | HEART RATE: 114 BPM | DIASTOLIC BLOOD PRESSURE: 83 MMHG | SYSTOLIC BLOOD PRESSURE: 136 MMHG | RESPIRATION RATE: 16 BRPM | OXYGEN SATURATION: 95 % | HEIGHT: 64 IN | TEMPERATURE: 98.3 F

## 2024-11-18 DIAGNOSIS — F39 EPISODIC MOOD DISORDER (H): ICD-10-CM

## 2024-11-18 DIAGNOSIS — Q03.9 CONGENITAL HYDROCEPHALUS (H): ICD-10-CM

## 2024-11-18 DIAGNOSIS — F81.9 COGNITIVE DEVELOPMENTAL DELAY: ICD-10-CM

## 2024-11-18 DIAGNOSIS — N92.0 EXCESSIVE OR FREQUENT MENSTRUATION: ICD-10-CM

## 2024-11-18 DIAGNOSIS — Z00.00 ROUTINE GENERAL MEDICAL EXAMINATION AT A HEALTH CARE FACILITY: Primary | ICD-10-CM

## 2024-11-18 PROCEDURE — 99395 PREV VISIT EST AGE 18-39: CPT | Performed by: PHYSICIAN ASSISTANT

## 2024-11-18 PROCEDURE — 99213 OFFICE O/P EST LOW 20 MIN: CPT | Mod: 25 | Performed by: PHYSICIAN ASSISTANT

## 2024-11-18 RX ORDER — LEVONORGESTREL / ETHINYL ESTRADIOL AND ETHINYL ESTRADIOL 150-30(84)
1 KIT ORAL DAILY
Qty: 84 TABLET | Refills: 4 | Status: SHIPPED | OUTPATIENT
Start: 2024-11-18

## 2024-11-18 ASSESSMENT — PAIN SCALES - GENERAL: PAINLEVEL_OUTOF10: NO PAIN (0)

## 2024-11-18 NOTE — PATIENT INSTRUCTIONS
Patient Education   Preventive Care Advice   This is general advice given by our system to help you stay healthy. However, your care team may have specific advice just for you. Please talk to your care team about your preventive care needs.  Nutrition  Eat 5 or more servings of fruits and vegetables each day.  Try wheat bread, brown rice and whole grain pasta (instead of white bread, rice, and pasta).  Get enough calcium and vitamin D. Check the label on foods and aim for 100% of the RDA (recommended daily allowance).  Lifestyle  Exercise at least 150 minutes each week  (30 minutes a day, 5 days a week).  Do muscle strengthening activities 2 days a week. These help control your weight and prevent disease.  No smoking.  Wear sunscreen to prevent skin cancer.  Have a dental exam and cleaning every 6 months.  Yearly exams  See your health care team every year to talk about:  Any changes in your health.  Any medicines your care team has prescribed.  Preventive care, family planning, and ways to prevent chronic diseases.  Shots (vaccines)   HPV shots (up to age 26), if you've never had them before.  Hepatitis B shots (up to age 59), if you've never had them before.  COVID-19 shot: Get this shot when it's due.  Flu shot: Get a flu shot every year.  Tetanus shot: Get a tetanus shot every 10 years.  Pneumococcal, hepatitis A, and RSV shots: Ask your care team if you need these based on your risk.  Shingles shot (for age 50 and up)  General health tests  Diabetes screening:  Starting at age 35, Get screened for diabetes at least every 3 years.  If you are younger than age 35, ask your care team if you should be screened for diabetes.  Cholesterol test: At age 39, start having a cholesterol test every 5 years, or more often if advised.  Bone density scan (DEXA): At age 50, ask your care team if you should have this scan for osteoporosis (brittle bones).  Hepatitis C: Get tested at least once in your life.  STIs (sexually  transmitted infections)  Before age 24: Ask your care team if you should be screened for STIs.  After age 24: Get screened for STIs if you're at risk. You are at risk for STIs (including HIV) if:  You are sexually active with more than one person.  You don't use condoms every time.  You or a partner was diagnosed with a sexually transmitted infection.  If you are at risk for HIV, ask about PrEP medicine to prevent HIV.  Get tested for HIV at least once in your life, whether you are at risk for HIV or not.  Cancer screening tests  Cervical cancer screening: If you have a cervix, begin getting regular cervical cancer screening tests starting at age 21.  Breast cancer scan (mammogram): If you've ever had breasts, begin having regular mammograms starting at age 40. This is a scan to check for breast cancer.  Colon cancer screening: It is important to start screening for colon cancer at age 45.  Have a colonoscopy test every 10 years (or more often if you're at risk) Or, ask your provider about stool tests like a FIT test every year or Cologuard test every 3 years.  To learn more about your testing options, visit:   .  For help making a decision, visit:   https://bit.ly/wj31656.  Prostate cancer screening test: If you have a prostate, ask your care team if a prostate cancer screening test (PSA) at age 55 is right for you.  Lung cancer screening: If you are a current or former smoker ages 50 to 80, ask your care team if ongoing lung cancer screenings are right for you.  For informational purposes only. Not to replace the advice of your health care provider. Copyright   2023 [employer]. All rights reserved. Clinically reviewed by the [facility]. [employer] [id number] - REV 01/24.

## 2024-11-18 NOTE — PROGRESS NOTES
"Preventive Care Visit  [facility] Kristen M. Kehr, PA-C, Family Medicine  Nov 18, 2024      Assessment & Plan     Routine general medical examination at a health care facility  Health maintenance reviewed and updated.    Episodic mood disorder (H)  Stable, refills given   - FLUoxetine (PROZAC) 20 MG capsule; Take 1 capsule (20 mg) by mouth daily.    Excessive or frequent menstruation  Doing well with the contraception for management.   No concerns, refills given   - levonorgest-eth estrad 91-Day (ASHLYNA) 0.15-0.03 &0.01 MG tablet; Take 1 tablet by mouth daily. Medication taken continuously, menstrual cycle every 3 months    Congenital hydrocephalus (H)  Cognitive developmental delay  She continues to live at home with her family.             BMI  Estimated body mass index is 34.84 kg/m  as calculated from the following:    Height as of this encounter: 1.626 m (5' 4\").    Weight as of this encounter: 92.1 kg (203 lb).   Weight management plan: Discussed healthy diet and exercise guidelines    Counseling  Appropriate preventive services were addressed with this patient via screening, questionnaire, or discussion as appropriate for fall prevention, nutrition, physical activity, Tobacco-use cessation, social engagement, weight loss and cognition.  Checklist reviewing preventive services available has been given to the patient.  Reviewed patient's diet, addressing concerns and/or questions.           Nabeel Bhat is a 34 year old, presenting for the following:  Physical        11/18/2024     4:10 PM   Additional Questions   Roomed by Leroy PEREIRA MA   Accompanied by sister- Kaya Bhat is here today with her sister. She is here for a routine exam.   She is feeling well and has no concerns.   She will need refills of her current prescriptions for the following:    Mood disorder: managed with fluoxetine 20 mg daily  Menorrhagia: managed with oral contraception.           Health Care " Directive  Patient has a Health Care Directive on file  Advance care planning document is on file and is current.      11/14/2024   General Health   How would you rate your overall physical health? Excellent   Feel stress (tense, anxious, or unable to sleep) Not at all            11/14/2024   Nutrition   Three or more servings of calcium each day? Yes   Diet: Regular (no restrictions)   How many servings of fruit and vegetables per day? (!) 2-3   How many sweetened beverages each day? 0-1            11/14/2024   Exercise   Days per week of moderate/strenous exercise 0 days   Average minutes spent exercising at this level 0 min      (!) EXERCISE CONCERN      11/14/2024   Social Factors   Frequency of gathering with friends or relatives Once a week   Worry food won't last until get money to buy more No   Food not last or not have enough money for food? No   Do you have housing? (Housing is defined as stable permanent housing and does not include staying ouside in a car, in a tent, in an abandoned building, in an overnight shelter, or couch-surfing.) Yes   Are you worried about losing your housing? No   Lack of transportation? No   Unable to get utilities (heat,electricity)? No            11/14/2024   Dental   Dentist two times every year? Yes            11/14/2024   TB Screening   Were you born outside of the [place]? No            Today's PHQ-2 Score:       11/18/2024     4:05 PM   PHQ-2 ( 1999 [employer])   Q1: Little interest or pleasure in doing things 0    Q2: Feeling down, depressed or hopeless 0    PHQ-2 Score 0    Q1: Little interest or pleasure in doing things Not at all   Q2: Feeling down, depressed or hopeless Not at all   PHQ-2 Score 0       Patient-reported           11/14/2024   Substance Use   Alcohol more than 3/day or more than 7/wk Not Applicable   Do you use any other substances recreationally? No        Social History     Tobacco Use    Smoking status: Never    Smokeless tobacco: Never   Vaping Use     Vaping status: Never Used   Substance Use Topics    Alcohol use: No    Drug use: No           10/25/2022   LAST FHS-7 RESULTS   1st degree relative breast or ovarian cancer Unknown    Any relative bilateral breast cancer Unknown    Any male have breast cancer No    Any ONE woman have BOTH breast AND ovarian cancer No    Any woman with breast cancer before 50yrs No    2 or more relatives with breast AND/OR ovarian cancer No    2 or more relatives with breast AND/OR bowel cancer No        Patient-reported        Mammogram Screening - Patient under 40 years of age: Routine Mammogram Screening not recommended.         11/14/2024   STI Screening   New sexual partner(s) since last STI/HIV test? No        History of abnormal Pap smear: No - age 30- 64 PAP with HPV every 5 years recommended        Latest Ref Rng & Units 10/25/2022    11:40 AM 10/7/2019     5:32 PM 12/28/2015    12:00 AM   PAP / HPV   PAP  Negative for Intraepithelial Lesion or Malignancy (NILM)      PAP (Historical)   NIL  NIL    HPV 16 DNA Negative Negative      HPV 18 DNA Negative Negative      Other HR HPV Negative Negative              11/14/2024   Contraception/Family Planning   Questions about contraception or family planning No           Reviewed and updated as needed this visit by Provider                    Past Medical History:   Diagnosis Date    Congenital hydrocephalus (H)     Development delay     Esotropia     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    Hearing loss     right ear    Mood disorder (H)     seen at [facility]     Past Surgical History:   Procedure Laterality Date    ABDOMEN SURGERY  October 2019    For shunt tube    BIOPSY  August 2017    Awaiting results    EYE SURGERY  age 4 or 5    strabismus     IMPLANT SHUNT VENTRICULOPERITONEAL  1 week of age    for hydrocephalus; had last revision at age 3    STRABISMUS SURGERY      SURGICAL HISTORY OF -   by age 5    strabismus surgery x2     BP Readings from Last 3 Encounters:    11/18/24 136/83   11/16/23 130/87   10/25/22 130/86    Wt Readings from Last 3 Encounters:   11/18/24 92.1 kg (203 lb)   11/16/23 88.5 kg (195 lb)   10/25/22 90.3 kg (199 lb)                  Patient Active Problem List   Diagnosis    RET s/p repair x2    Congenital hydrocephalus (H)    Cognitive developmental delay    PCOS (polycystic ovarian syndrome)    Nonfunctioning ventriculoperitoneal shunt (H)    Episodic mood disorder (H)    Hearing loss    Constipation    CARDIOVASCULAR SCREENING; LDL GOAL LESS THAN 160    Excessive or frequent menstruation    Anxiety    Congenital jaw deformity    ACP (advance care planning)    At high risk for falls     Past Surgical History:   Procedure Laterality Date    ABDOMEN SURGERY  October 2019    For shunt tube    BIOPSY  August 2017    Awaiting results    EYE SURGERY  age 4 or 5    strabismus     IMPLANT SHUNT VENTRICULOPERITONEAL  1 week of age    for hydrocephalus; had last revision at age 3    STRABISMUS SURGERY      SURGICAL HISTORY OF -   by age 5    strabismus surgery x2       Social History     Tobacco Use    Smoking status: Never    Smokeless tobacco: Never   Substance Use Topics    Alcohol use: No     Family History   Problem Relation Age of Onset    Diabetes Mother     Hypertension Mother     Obesity Mother     Glaucoma Father     Hypertension Father     Obesity Father     Cancer Paternal Grandmother     Heart Disease Paternal Grandfather     Macular Degeneration No family hx of          Current Outpatient Medications   Medication Sig Dispense Refill    FLUoxetine (PROZAC) 20 MG capsule Take 1 capsule (20 mg) by mouth daily. 90 capsule 3    levonorgest-eth estrad 91-Day (ASHLYNA) 0.15-0.03 &0.01 MG tablet Take 1 tablet by mouth daily. Medication taken continuously, menstrual cycle every 3 months 84 tablet 4    Multiple Vitamin (DAILY MULTIVITAMIN PO) Take  by mouth.       No Known Allergies  Recent Labs   Lab Test 12/05/23  0923 10/10/18  0758   A1C 5.3  --    LDL  "92 86   HDL 48* 57   TRIG 116 112   CR  --  0.62   GFRESTIMATED  --  >90   GFRESTBLACK  --  >90   POTASSIUM  --  4.2   TSH  --  1.73          Review of Systems  Constitutional, HEENT, cardiovascular, pulmonary, GI, , musculoskeletal, neuro, skin, endocrine and psych systems are negative, except as otherwise noted.     Objective    Exam  /83   Pulse 114   Temp 98.3  F (36.8  C) (Tympanic)   Resp 16   Ht 1.626 m (5' 4\")   Wt 92.1 kg (203 lb)   LMP  (LMP Unknown)   SpO2 95%   Breastfeeding No   BMI 34.84 kg/m     Estimated body mass index is 34.84 kg/m  as calculated from the following:    Height as of this encounter: 1.626 m (5' 4\").    Weight as of this encounter: 92.1 kg (203 lb).    Physical Exam  GENERAL: alert and no distress  EYES: Eyes grossly normal to inspection, PERRL and conjunctivae and sclerae normal  HENT: ear canals and TM's normal, nose and mouth without ulcers or lesions  NECK: no adenopathy, no asymmetry, masses, or scars  RESP: lungs clear to auscultation - no rales, rhonchi or wheezes  CV: regular rate and rhythm, normal S1 S2, no S3 or S4, no murmur, click or rub, no peripheral edema  ABDOMEN: soft, nontender, no hepatosplenomegaly, no masses and bowel sounds normal  MS: no gross musculoskeletal defects noted, no edema  SKIN: no suspicious lesions or rashes  NEURO: Normal strength and tone, mentation intact and speech normal  PSYCH: mentation appears normal, affect normal/bright, judgement and insight impaired, and appearance well groomed        Signed Electronically by: Kristen M. Kehr, PA-C    "

## 2025-04-28 ENCOUNTER — TELEPHONE (OUTPATIENT)
Dept: FAMILY MEDICINE | Facility: CLINIC | Age: 35
End: 2025-04-28
Payer: COMMERCIAL

## 2025-04-28 NOTE — TELEPHONE ENCOUNTER
FYI - Status Update    Who is Calling: patient    Update: [facility]GM [facility]. Patient states [facility] will be sending paperwork for this request on 04/29/2025.      Does caller want a call/response back: Yes     Could we send this information to you in [facility] or would you prefer to receive a phone call?:   Patient would prefer a phone call   Okay to leave a detailed message?: Yes at Cell number on file:    Telephone Information:   Mobile [phone]
